# Patient Record
Sex: MALE | Race: WHITE | URBAN - METROPOLITAN AREA
[De-identification: names, ages, dates, MRNs, and addresses within clinical notes are randomized per-mention and may not be internally consistent; named-entity substitution may affect disease eponyms.]

---

## 2020-09-22 ENCOUNTER — OFFICE VISIT (OUTPATIENT)
Dept: URBAN - METROPOLITAN AREA CLINIC 128 | Facility: CLINIC | Age: 65
End: 2020-09-22
Payer: MEDICARE

## 2020-09-22 DIAGNOSIS — R19.5 POSITIVE COLORECTAL CANCER SCREENING USING COLOGUARD TEST: ICD-10-CM

## 2020-09-22 DIAGNOSIS — K21.9 GERD (GASTROESOPHAGEAL REFLUX DISEASE): ICD-10-CM

## 2020-09-22 DIAGNOSIS — K59.01 CONSTIPATION: ICD-10-CM

## 2020-09-22 PROCEDURE — G8420 CALC BMI NORM PARAMETERS: HCPCS | Performed by: INTERNAL MEDICINE

## 2020-09-22 PROCEDURE — G8427 DOCREV CUR MEDS BY ELIG CLIN: HCPCS | Performed by: INTERNAL MEDICINE

## 2020-09-22 PROCEDURE — 3017F COLORECTAL CA SCREEN DOC REV: CPT | Performed by: INTERNAL MEDICINE

## 2020-09-22 PROCEDURE — G9903 PT SCRN TBCO ID AS NON USER: HCPCS | Performed by: INTERNAL MEDICINE

## 2020-09-22 PROCEDURE — 99214 OFFICE O/P EST MOD 30 MIN: CPT | Performed by: INTERNAL MEDICINE

## 2020-09-22 RX ORDER — ATORVASTATIN CALCIUM 10 MG/1
1 TABLET TABLET, FILM COATED ORAL ONCE A DAY
Status: ACTIVE | COMMUNITY

## 2020-09-22 RX ORDER — BISOPROLOL FUMARATE 5 MG/1
1 TABLET TABLET, FILM COATED ORAL ONCE A DAY
Status: ACTIVE | COMMUNITY

## 2020-09-22 RX ORDER — ASPIRIN 81 MG/1
1 TABLET TABLET, COATED ORAL ONCE A DAY
Status: ACTIVE | COMMUNITY

## 2020-09-22 NOTE — HPI-TODAY'S VISIT:
Mr. Portillo is a pleasant 65-year-old gentleman who comes in today in consultation for positive Cologuard test.  Mr. Portillo offers GERD symptoms 3-4 times a week for most of his adult life which she has treated only with as needed medications.  No nausea or vomiting, no dysphagia or odynophagia.  Appetite is good and weight is stable. Mr. Portillo offers no prior colon cancer screening before recent Cologuard testing that was positive.  He offers no obvious hematochezia or melena.  Bowel movements are chronically irregular describing 3-4 bowel movements per week.  No known family history of colon polyps or colon cancer.  He takes a baby aspirin daily but denies use of other nonsteroidal anti-inflammatory drugs on a regular basis.  He denies excessive alcohol use.

## 2020-10-06 ENCOUNTER — OFFICE VISIT (OUTPATIENT)
Dept: URBAN - METROPOLITAN AREA SURGERY CENTER 31 | Facility: SURGERY CENTER | Age: 65
End: 2020-10-06
Payer: MEDICARE

## 2020-10-06 DIAGNOSIS — D12.7 BENIGN NEOPLASM OF RECTOSIGMOID JUNCTION: ICD-10-CM

## 2020-10-06 DIAGNOSIS — K31.89 ACQUIRED DEFORMITY OF DUODENUM: ICD-10-CM

## 2020-10-06 DIAGNOSIS — D12.4 ADENOMA OF DESCENDING COLON: ICD-10-CM

## 2020-10-06 DIAGNOSIS — D12.3 ADENOMA OF TRANSVERSE COLON: ICD-10-CM

## 2020-10-06 DIAGNOSIS — R19.5 ABNORMAL FECES: ICD-10-CM

## 2020-10-06 DIAGNOSIS — K22.8 COLUMNAR-LINED ESOPHAGUS: ICD-10-CM

## 2020-10-06 PROCEDURE — 45385 COLONOSCOPY W/LESION REMOVAL: CPT | Performed by: INTERNAL MEDICINE

## 2020-10-06 PROCEDURE — 43239 EGD BIOPSY SINGLE/MULTIPLE: CPT | Performed by: INTERNAL MEDICINE

## 2020-10-06 PROCEDURE — G9937 DIG OR SURV COLSCO: HCPCS | Performed by: INTERNAL MEDICINE

## 2020-10-06 PROCEDURE — G8907 PT DOC NO EVENTS ON DISCHARG: HCPCS | Performed by: INTERNAL MEDICINE

## 2020-11-04 ENCOUNTER — OFFICE VISIT (OUTPATIENT)
Dept: URBAN - METROPOLITAN AREA CLINIC 128 | Facility: CLINIC | Age: 65
End: 2020-11-04

## 2020-11-16 ENCOUNTER — OFFICE VISIT (OUTPATIENT)
Dept: URBAN - METROPOLITAN AREA CLINIC 128 | Facility: CLINIC | Age: 65
End: 2020-11-16
Payer: MEDICARE

## 2020-11-16 ENCOUNTER — WEB ENCOUNTER (OUTPATIENT)
Dept: URBAN - METROPOLITAN AREA CLINIC 128 | Facility: CLINIC | Age: 65
End: 2020-11-16

## 2020-11-16 DIAGNOSIS — K22.70 BARRETTS ESOPHAGUS: ICD-10-CM

## 2020-11-16 DIAGNOSIS — K59.01 CONSTIPATION: ICD-10-CM

## 2020-11-16 DIAGNOSIS — K29.60 OTHER GASTRITIS WITHOUT BLEEDING: ICD-10-CM

## 2020-11-16 DIAGNOSIS — R19.5 POSITIVE COLORECTAL CANCER SCREENING USING COLOGUARD TEST: ICD-10-CM

## 2020-11-16 PROBLEM — 302914006 BARRETTS ESOPHAGUS: Status: ACTIVE | Noted: 2020-11-16

## 2020-11-16 PROBLEM — 235595009 GASTROESOPHAGEAL REFLUX DISEASE: Status: ACTIVE | Noted: 2020-11-16

## 2020-11-16 PROBLEM — 14760008 CONSTIPATION: Status: ACTIVE | Noted: 2020-11-16

## 2020-11-16 PROBLEM — 4556007 GASTRITIS: Status: ACTIVE | Noted: 2020-11-16

## 2020-11-16 PROCEDURE — G8483 FLU IMM NO ADMIN DOC REA: HCPCS | Performed by: INTERNAL MEDICINE

## 2020-11-16 PROCEDURE — G8427 DOCREV CUR MEDS BY ELIG CLIN: HCPCS | Performed by: INTERNAL MEDICINE

## 2020-11-16 PROCEDURE — G9902 PT SCRN TBCO AND ID AS USER: HCPCS | Performed by: INTERNAL MEDICINE

## 2020-11-16 PROCEDURE — 4004F PT TOBACCO SCREEN RCVD TLK: CPT | Performed by: INTERNAL MEDICINE

## 2020-11-16 PROCEDURE — 99213 OFFICE O/P EST LOW 20 MIN: CPT | Performed by: INTERNAL MEDICINE

## 2020-11-16 PROCEDURE — G9906 PT RECV TBCO CESS INTERV: HCPCS | Performed by: INTERNAL MEDICINE

## 2020-11-16 PROCEDURE — G8420 CALC BMI NORM PARAMETERS: HCPCS | Performed by: INTERNAL MEDICINE

## 2020-11-16 PROCEDURE — 3017F COLORECTAL CA SCREEN DOC REV: CPT | Performed by: INTERNAL MEDICINE

## 2020-11-16 RX ORDER — BISOPROLOL FUMARATE 5 MG/1
1 TABLET TABLET, FILM COATED ORAL ONCE A DAY
Status: ACTIVE | COMMUNITY

## 2020-11-16 RX ORDER — PANTOPRAZOLE SODIUM 40 MG/1
1 TABLET TABLET, DELAYED RELEASE ORAL ONCE A DAY
Qty: 90 | Refills: 3 | OUTPATIENT
Start: 2020-11-16

## 2020-11-16 RX ORDER — ASPIRIN 81 MG/1
1 TABLET TABLET, COATED ORAL ONCE A DAY
Status: ACTIVE | COMMUNITY

## 2020-11-16 RX ORDER — ATORVASTATIN CALCIUM 10 MG/1
1 TABLET TABLET, FILM COATED ORAL ONCE A DAY
Status: ACTIVE | COMMUNITY

## 2020-11-16 NOTE — HPI-TODAY'S VISIT:
Mr. Portillo is a pleasant 65-year-old gentleman who comes in today in consultation for positive Cologuard test.  Mr. Portillo offers GERD symptoms 3-4 times a week for most of his adult life which she has treated only with as needed medications.  No nausea or vomiting, no dysphagia or odynophagia.  Appetite is good and weight is stable. Mr. Portillo offers no prior colon cancer screening before recent Cologuard testing that was positive.  He offers no obvious hematochezia or melena.  Bowel movements are chronically irregular describing 3-4 bowel movements per week.  No known family history of colon polyps or colon cancer.  He takes a baby aspirin daily but denies use of other nonsteroidal anti-inflammatory drugs on a regular basis.  He denies excessive alcohol use. His 11/2020 EGD revealed gastritis and positive for Barretts esophagus and colonoscopy revealed multiple polyps, tubular adenomas, serrated adenomas and hyperplastic polyp, the largest was 11mm.

## 2021-11-28 ENCOUNTER — ERX REFILL RESPONSE (OUTPATIENT)
Dept: URBAN - METROPOLITAN AREA CLINIC 128 | Facility: CLINIC | Age: 66
End: 2021-11-28

## 2021-11-28 RX ORDER — PANTOPRAZOLE 40 MG/1
TAKE (1) ONE TABLET BY MOUTH ONCE DAILY TABLET, DELAYED RELEASE ORAL
Qty: 90 TABLET | Refills: 2 | OUTPATIENT

## 2021-11-28 RX ORDER — PANTOPRAZOLE SODIUM 40 MG/1
1 TABLET TABLET, DELAYED RELEASE ORAL ONCE A DAY
Qty: 90 | Refills: 3 | OUTPATIENT

## 2022-04-24 ENCOUNTER — ERX REFILL RESPONSE (OUTPATIENT)
Dept: URBAN - METROPOLITAN AREA CLINIC 128 | Facility: CLINIC | Age: 67
End: 2022-04-24

## 2022-04-24 RX ORDER — PANTOPRAZOLE 40 MG/1
TAKE (1) ONE TABLET BY MOUTH ONCE DAILY TABLET, DELAYED RELEASE ORAL
Qty: 90 TABLET | Refills: 2 | OUTPATIENT

## 2022-04-24 RX ORDER — PANTOPRAZOLE 40 MG/1
TAKE (1) ONE TABLET BY MOUTH ONCE DAILY TABLET, DELAYED RELEASE ORAL
Qty: 90 TABLET | Refills: 1 | OUTPATIENT

## 2023-06-07 ENCOUNTER — OFFICE VISIT (OUTPATIENT)
Dept: URBAN - METROPOLITAN AREA CLINIC 128 | Facility: CLINIC | Age: 68
End: 2023-06-07
Payer: MEDICARE

## 2023-06-07 ENCOUNTER — DASHBOARD ENCOUNTERS (OUTPATIENT)
Age: 68
End: 2023-06-07

## 2023-06-07 VITALS
BODY MASS INDEX: 27.97 KG/M2 | HEIGHT: 67 IN | WEIGHT: 178.2 LBS | HEART RATE: 70 BPM | DIASTOLIC BLOOD PRESSURE: 82 MMHG | TEMPERATURE: 97.9 F | SYSTOLIC BLOOD PRESSURE: 126 MMHG

## 2023-06-07 DIAGNOSIS — K22.70 BARRETTS ESOPHAGUS: ICD-10-CM

## 2023-06-07 DIAGNOSIS — K59.01 CONSTIPATION: ICD-10-CM

## 2023-06-07 DIAGNOSIS — K63.5 COLON POLYPS: ICD-10-CM

## 2023-06-07 DIAGNOSIS — K29.70 GASTRITIS: ICD-10-CM

## 2023-06-07 PROCEDURE — 99214 OFFICE O/P EST MOD 30 MIN: CPT | Performed by: PHYSICIAN ASSISTANT

## 2023-06-07 RX ORDER — ASPIRIN 81 MG/1
1 TABLET TABLET, COATED ORAL ONCE A DAY
Status: ACTIVE | COMMUNITY

## 2023-06-07 RX ORDER — BISOPROLOL FUMARATE 5 MG/1
1 TABLET TABLET, FILM COATED ORAL ONCE A DAY
Status: ON HOLD | COMMUNITY

## 2023-06-07 RX ORDER — ATORVASTATIN CALCIUM 10 MG/1
1 TABLET TABLET, FILM COATED ORAL ONCE A DAY
Status: ON HOLD | COMMUNITY

## 2023-06-07 RX ORDER — PANTOPRAZOLE SODIUM 40 MG/1
1 TABLET TABLET, DELAYED RELEASE ORAL ONCE A DAY
Qty: 90 | Refills: 3 | OUTPATIENT

## 2023-06-07 RX ORDER — PANTOPRAZOLE 40 MG/1
TAKE (1) ONE TABLET BY MOUTH ONCE DAILY TABLET, DELAYED RELEASE ORAL
Qty: 90 TABLET | Refills: 1 | Status: ACTIVE | COMMUNITY

## 2023-06-07 RX ORDER — ROSUVASTATIN CALCIUM 5 MG/1
1 TABLET TABLET, COATED ORAL ONCE A DAY
Status: ACTIVE | COMMUNITY

## 2023-06-07 NOTE — HPI-TODAY'S VISIT:
The patient presents for a colonoscopy. There is no family history of colon polyps or colon cancer. Patient denies change in bowel habits, appetite, and weight. Patient denies rectal bleeding. Patient denies any cardiac, lung, or kidney problems. Patient denies any digestive symptoms currently. Last colonoscopy: 2020, hyperplastic polyp, tubular adenoma, and sessile serrated colon polyps were noted and a 1 year colonoscopy was advised so he is overdue. His 2020 EGD revealed gastritis and he was positive for Barretts and a 2 year repeat EGD was advised so he is overdue for this too.

## 2023-07-05 ENCOUNTER — OFFICE VISIT (OUTPATIENT)
Dept: URBAN - METROPOLITAN AREA SURGERY CENTER 31 | Facility: SURGERY CENTER | Age: 68
End: 2023-07-05
Payer: MEDICARE

## 2023-07-05 ENCOUNTER — CLAIMS CREATED FROM THE CLAIM WINDOW (OUTPATIENT)
Dept: URBAN - METROPOLITAN AREA CLINIC 4 | Facility: CLINIC | Age: 68
End: 2023-07-05
Payer: MEDICARE

## 2023-07-05 DIAGNOSIS — Z86.010 ADENOMAS PERSONAL HISTORY OF COLONIC POLYPS: ICD-10-CM

## 2023-07-05 DIAGNOSIS — K22.70 BARRETT ESOPHAGUS: ICD-10-CM

## 2023-07-05 DIAGNOSIS — K22.719 BARRETT'S ESOPHAGUS WITH DYSPLASIA, UNSPECIFIED: ICD-10-CM

## 2023-07-05 PROCEDURE — G0105 COLORECTAL SCRN; HI RISK IND: HCPCS | Performed by: INTERNAL MEDICINE

## 2023-07-05 PROCEDURE — 43239 EGD BIOPSY SINGLE/MULTIPLE: CPT | Performed by: INTERNAL MEDICINE

## 2023-07-05 PROCEDURE — 88313 SPECIAL STAINS GROUP 2: CPT | Performed by: PATHOLOGY

## 2023-07-05 PROCEDURE — 88305 TISSUE EXAM BY PATHOLOGIST: CPT | Performed by: PATHOLOGY

## 2023-07-05 PROCEDURE — 88312 SPECIAL STAINS GROUP 1: CPT | Performed by: PATHOLOGY

## 2024-02-05 ENCOUNTER — APPOINTMENT (RX ONLY)
Dept: URBAN - METROPOLITAN AREA CLINIC 159 | Facility: CLINIC | Age: 69
Setting detail: DERMATOLOGY
End: 2024-02-05

## 2024-02-05 PROBLEM — C44.42 SQUAMOUS CELL CARCINOMA OF SKIN OF SCALP AND NECK: Status: ACTIVE | Noted: 2024-02-05

## 2024-02-05 PROCEDURE — ? PRESCRIPTION

## 2024-02-05 PROCEDURE — 13121 CMPLX RPR S/A/L 2.6-7.5 CM: CPT

## 2024-02-05 PROCEDURE — ? MOHS SURGERY

## 2024-02-05 PROCEDURE — 17311 MOHS 1 STAGE H/N/HF/G: CPT

## 2024-02-05 RX ORDER — CEPHALEXIN 500 MG/1
CAPSULE ORAL
Qty: 14 | Refills: 0 | COMMUNITY
Start: 2024-02-05

## 2024-02-05 RX ORDER — TRAMADOL HYDROCHLORIDE 50 MG/1
TABLET, FILM COATED ORAL
Qty: 10 | Refills: 0 | COMMUNITY
Start: 2024-02-05

## 2024-02-05 RX ADMIN — CEPHALEXIN: 500 CAPSULE ORAL at 00:00

## 2024-02-05 RX ADMIN — TRAMADOL HYDROCHLORIDE: 50 TABLET, FILM COATED ORAL at 00:00

## 2024-02-05 NOTE — PROCEDURE: MOHS SURGERY
Mohs Case Number: 
Date Of Previous Biopsy (Optional): 1-15-24
Previous Accession (Optional): [id number]
Biopsy Photograph Reviewed: Yes
Referring Physician (Optional): Mukesh
Consent Type: Consent 1 (Standard)
Eye Shield Used: No
Initial Size Of Lesion: 1.2
Number Of Stages: 1
Primary Defect Length In Cm (Final Defect Size - Required For Flaps/Grafts): 1.6
Repair Type: Complex Repair
Which Eyelid Repair Cpt Are You Using?: 19177
Oculoplastic Surgeon Procedure Text (A): After obtaining clear surgical margins the patient was sent to oculoplastics for surgical repair.  The patient understands they will receive post-surgical care and follow-up from the referring physician's office.
Otolaryngologist Procedure Text (A): After obtaining clear surgical margins the patient was sent to otolaryngology for surgical repair.  The patient understands they will receive post-surgical care and follow-up from the referring physician's office.
Plastic Surgeon Procedure Text (A): After obtaining clear surgical margins the patient was sent to plastics for surgical repair.  The patient understands they will receive post-surgical care and follow-up from the referring physician's office.
Mid-Level Procedure Text (A): After obtaining clear surgical margins the patient was sent to a mid-level provider for surgical repair.  The patient understands they will receive post-surgical care and follow-up from the mid-level provider.
Provider Procedure Text (A): After obtaining clear surgical margins the defect was repaired by another provider.
Asc Procedure Text (A): After obtaining clear surgical margins the patient was sent to an ASC for surgical repair.  The patient understands they will receive post-surgical care and follow-up from the ASC physician.
Simple / Intermediate / Complex Repair - Final Wound Length In Cm: 4.5
Suturegard Retention Suture: 2-0 Nylon
Retention Suture Bite Size: 3 mm
Length To Time In Minutes Device Was In Place: 10
Distance Of Undermining In Cm (Required): 2.2
Undermining Type: Entire Wound
Debridement Text: The wound edges were debrided prior to proceeding with the closure to facilitate wound healing.
Helical Rim Text: The closure involved the helical rim.
Vermilion Border Text: The closure involved the vermilion border.
Nostril Rim Text: The closure involved the nostril rim.
Retention Suture Text: Retention sutures were placed to support the closure and prevent dehiscence.
Secondary Defect Length In Cm (Required For Flaps): 0
Area H Indication Text: Tumors in this location are included in Area H (eyelids, eyebrows, nose, lips, chin, ear, pre-auricular, post-auricular, temple, genitalia, hands, feet, ankles and areola).  Tissue conservation is critical in these anatomic locations.
Area M Indication Text: Tumors in this location are included in Area M (cheek, forehead, scalp, neck, jawline and pretibial skin).  Mohs surgery is indicated for tumors in these anatomic locations.
Area L Indication Text: Tumors in this location are included in Area L (trunk and extremities).  Mohs surgery is indicated for larger tumors, or tumors with aggressive histologic features, in these anatomic locations.
Tumor Debulked?: not debulked
Depth Of Tumor Invasion (For Histology): tumor not visualized
Perineural Invasion (For Histology - Be Specific If Possible): absent
Stage 1: Number Of Blocks?: 2
Special Stains Stage 1 - Results: Base On Clearance Noted Above
Stage 2: Additional Anesthesia Type: 1% lidocaine with epinephrine
Staging Info: By selecting yes to the question above you will include information on AJCC 8 tumor staging in your Mohs note. Information on tumor staging will be automatically added for SCCs on the head and neck. AJCC 8 includes tumor size, tumor depth, perineural involvement and bone invasion.
Tumor Depth: Less than 6mm from granular layer and no invasion beyond the subcutaneous fat
Why Was The Change Made?: Please Select the Appropriate Response
Medical Necessity Statement: Based on my medical judgement, Mohs surgery is the most appropriate treatment for this cancer compared to other treatments.
Alternatives Discussed Intro (Do Not Add Period): I discussed alternative treatments to Mohs surgery and specifically discussed the risks and benefits of
Consent 1/Introductory Paragraph: The rationale for Mohs was explained to the patient and consent was obtained. The risks, benefits and alternatives to therapy were discussed in detail. Specifically, the risks of infection, scarring, bleeding, prolonged wound healing, incomplete removal, allergy to anesthesia, nerve injury and recurrence were addressed. Prior to the procedure, the treatment site was clearly identified and confirmed by the patient. All components of Universal Protocol/PAUSE Rule completed.
Consent 2/Introductory Paragraph: Mohs surgery was explained to the patient and consent was obtained. The risks, benefits and alternatives to therapy were discussed in detail. Specifically, the risks of infection, scarring, bleeding, prolonged wound healing, incomplete removal, allergy to anesthesia, nerve injury and recurrence were addressed. Prior to the procedure, the treatment site was clearly identified and confirmed by the patient. All components of Universal Protocol/PAUSE Rule completed.
Consent 3/Introductory Paragraph: I gave the patient a chance to ask questions they had about the procedure.  Following this I explained the Mohs procedure and consent was obtained. The risks, benefits and alternatives to therapy were discussed in detail. Specifically, the risks of infection, scarring, bleeding, prolonged wound healing, incomplete removal, allergy to anesthesia, nerve injury and recurrence were addressed. Prior to the procedure, the treatment site was clearly identified and confirmed by the patient. All components of Universal Protocol/PAUSE Rule completed.
Consent (Temporal Branch)/Introductory Paragraph: The rationale for Mohs was explained to the patient and consent was obtained. The risks, benefits and alternatives to therapy were discussed in detail. Specifically, the risks of damage to the temporal branch of the facial nerve, infection, scarring, bleeding, prolonged wound healing, incomplete removal, allergy to anesthesia, and recurrence were addressed. Prior to the procedure, the treatment site was clearly identified and confirmed by the patient. All components of Universal Protocol/PAUSE Rule completed.
Consent (Marginal Mandibular)/Introductory Paragraph: The rationale for Mohs was explained to the patient and consent was obtained. The risks, benefits and alternatives to therapy were discussed in detail. Specifically, the risks of damage to the marginal mandibular branch of the facial nerve, infection, scarring, bleeding, prolonged wound healing, incomplete removal, allergy to anesthesia, and recurrence were addressed. Prior to the procedure, the treatment site was clearly identified and confirmed by the patient. All components of Universal Protocol/PAUSE Rule completed.
Consent (Spinal Accessory)/Introductory Paragraph: The rationale for Mohs was explained to the patient and consent was obtained. The risks, benefits and alternatives to therapy were discussed in detail. Specifically, the risks of damage to the spinal accessory nerve, infection, scarring, bleeding, prolonged wound healing, incomplete removal, allergy to anesthesia, and recurrence were addressed. Prior to the procedure, the treatment site was clearly identified and confirmed by the patient. All components of Universal Protocol/PAUSE Rule completed.
Consent (Near Eyelid Margin)/Introductory Paragraph: The rationale for Mohs was explained to the patient and consent was obtained. The risks, benefits and alternatives to therapy were discussed in detail. Specifically, the risks of ectropion or eyelid deformity, infection, scarring, bleeding, prolonged wound healing, incomplete removal, allergy to anesthesia, nerve injury and recurrence were addressed. Prior to the procedure, the treatment site was clearly identified and confirmed by the patient. All components of Universal Protocol/PAUSE Rule completed.
Consent (Ear)/Introductory Paragraph: The rationale for Mohs was explained to the patient and consent was obtained. The risks, benefits and alternatives to therapy were discussed in detail. Specifically, the risks of ear deformity, infection, scarring, bleeding, prolonged wound healing, incomplete removal, allergy to anesthesia, nerve injury and recurrence were addressed. Prior to the procedure, the treatment site was clearly identified and confirmed by the patient. All components of Universal Protocol/PAUSE Rule completed.
Consent (Nose)/Introductory Paragraph: The rationale for Mohs was explained to the patient and consent was obtained. The risks, benefits and alternatives to therapy were discussed in detail. Specifically, the risks of nasal deformity, changes in the flow of air through the nose, infection, scarring, bleeding, prolonged wound healing, incomplete removal, allergy to anesthesia, nerve injury and recurrence were addressed. Prior to the procedure, the treatment site was clearly identified and confirmed by the patient. All components of Universal Protocol/PAUSE Rule completed.
Consent (Lip)/Introductory Paragraph: The rationale for Mohs was explained to the patient and consent was obtained. The risks, benefits and alternatives to therapy were discussed in detail. Specifically, the risks of lip deformity, changes in the oral aperture, infection, scarring, bleeding, prolonged wound healing, incomplete removal, allergy to anesthesia, nerve injury and recurrence were addressed. Prior to the procedure, the treatment site was clearly identified and confirmed by the patient. All components of Universal Protocol/PAUSE Rule completed.
Consent (Scalp)/Introductory Paragraph: The rationale for Mohs was explained to the patient and consent was obtained. The risks, benefits and alternatives to therapy were discussed in detail. Specifically, the risks of changes in hair growth pattern secondary to repair, infection, scarring, bleeding, prolonged wound healing, incomplete removal, allergy to anesthesia, nerve injury and recurrence were addressed. Prior to the procedure, the treatment site was clearly identified and confirmed by the patient. All components of Universal Protocol/PAUSE Rule completed.
Detail Level: Detailed
Postop Diagnosis: same
Anesthesia Type: 1% lidocaine with epinephrine and a 1:10 solution of 8.4% sodium bicarbonate
Anesthesia Volume In Cc: 3
Hemostasis: Electrocautery
Estimated Blood Loss (Cc): minimal
Repair Anesthesia Method: local infiltration
Anesthesia Volume In Cc: 6
Brow Lift Text: A midfrontal incision was made medially to the defect to allow access to the tissues just superior to the left eyebrow. Following careful dissection inferiorly in a supraperiosteal plane to the level of the left eyebrow, several 3-0 monocryl sutures were used to resuspend the eyebrow orbicularis oculi muscular unit to the superior frontal bone periosteum. This resulted in an appropriate reapproximation of static eyebrow symmetry and correction of the left brow ptosis.
Deep Sutures: 3-0 Vicryl
Epidermal Sutures: 4-0 Prolene
Epidermal Closure: simple interrupted
Suturegard Intro: Intraoperative tissue expansion was performed, utilizing the SUTUREGARD device, in order to reduce wound tension.
Suturegard Body: The suture ends were repeatedly re-tightened and re-clamped to achieve the desired tissue expansion.
Hemigard Intro: Due to skin fragility and wound tension, it was decided to use HEMIGARD adhesive retention suture devices to permit a linear closure. The skin was cleaned and dried for a 6cm distance away from the wound. Excessive hair, if present, was removed to allow for adhesion.
Hemigard Postcare Instructions: The HEMIGARD strips are to remain completely dry for at least 5-7 days.
Donor Site Anesthesia Type: same as repair anesthesia
Graft Donor Site Bandage (Optional-Leave Blank If You Don't Want In Note): Steri-strips and a pressure bandage were applied to the donor site.
Closure 2 Information: This tab is for additional flaps and grafts, including complex repair and grafts and complex repair and flaps. You can also specify a different location for the additional defect, if the location is the same you do not need to select a new one. We will insert the automated text for the repair you select below just as we do for solitary flaps and grafts. Please note that at this time if you select a location with a different insurance zone you will need to override the ICD10 and CPT if appropriate.
Closure 3 Information: This tab is for additional flaps and grafts above and beyond our usual structured repairs.  Please note if you enter information here it will not currently bill and you will need to add the billing information manually.
Wound Care: Vaseline
Dressing: pressure dressing with telfa
Wound Care (No Sutures): Petrolatum
Dressing (No Sutures): dry sterile dressing
Suture Removal: 16 days
Unna Boot Text: An Unna boot was placed to help immobilize the limb and facilitate more rapid healing.
Home Suture Removal Text: Patient was provided instructions on removing sutures and will remove their sutures at home.  If they have any questions or difficulties they will call the office.
Post-Care Instructions: I reviewed with the patient in detail post-care instructions. Patient is not to engage in any heavy lifting, exercise, or swimming for the next 14 days. Should the patient develop any fevers, chills, bleeding, severe pain patient will contact the office immediately.
Pain Refusal Text: I offered to prescribe pain medication but the patient refused to take this medication.
Mauc Instructions: By selecting yes to the question below the MAUC number will be added into the note.  This will be calculated automatically based on the diagnosis chosen, the size entered, the body zone selected (H,M,L) and the specific indications you chose. You will also have the option to override the Mohs AUC if you disagree with the automatically calculated number and this option is found in the Case Summary tab.
Where Do You Want The Question To Include Opioid Counseling Located?: Case Summary Tab
Eye Protection Verbiage: Before proceeding with the stage, a plastic scleral shield was inserted. The globe was anesthetized with a few drops of 1% lidocaine with 1:100,000 epinephrine. Then, an appropriate sized scleral shield was chosen and coated with lacrilube ointment. The shield was gently inserted and left in place for the duration of each stage. After the stage was completed, the shield was gently removed.
Mohs Method Verbiage: An incision at a 45 degree angle following the standard Mohs approach was done and the specimen was harvested as a microscopic controlled layer.
Surgeon/Pathologist Verbiage (Will Incorporate Name Of Surgeon From Intro If Not Blank): operated in two distinct and integrated capacities as the surgeon and pathologist.
Mohs Histo Method Verbiage: Each section was then chromacoded and processed in the Mohs lab using the Mohs protocol and submitted for frozen section, utilizing hematoxylin and eosin histochemical stains.
Subsequent Stages Histo Method Verbiage: Using a similar technique to that described above, a thin layer of tissue was removed from all areas where tumor was visible on the previous stage.  The tissue was again oriented, mapped, dyed, and processed as above.
Mohs Rapid Report Verbiage: The area of clinically evident tumor was marked with skin marking ink and appropriately hatched.  The initial incision was made following the Mohs approach through the skin.  The specimen was taken to the lab, divided into the necessary number of pieces, chromacoded and processed according to the Mohs protocol.  This was repeated in successive stages until a tumor free defect was achieved.
Complex Repair Preamble Text (Leave Blank If You Do Not Want): Extensive wide undermining was performed.
Intermediate Repair Preamble Text (Leave Blank If You Do Not Want): Undermining was performed with blunt dissection.
Non-Graft Cartilage Fenestration Text: The cartilage was fenestrated with a 2mm punch biopsy to help facilitate healing.
Graft Cartilage Fenestration Text: The cartilage was fenestrated with a 2mm punch biopsy to help facilitate graft survival and healing.
Secondary Intention Text (Leave Blank If You Do Not Want): The defect will heal with secondary intention.
No Repair - Repaired With Adjacent Surgical Defect Text (Leave Blank If You Do Not Want): After obtaining clear surgical margins the defect was repaired concurrently with another surgical defect which was in close approximation.
Adjacent Tissue Transfer Text: The defect edges were debeveled with a #15 scalpel blade. Given the location of the defect and the proximity to free margins an adjacent tissue transfer was deemed most appropriate. Using a sterile surgical marker, an appropriate flap was drawn incorporating the defect and placing the expected incisions within the relaxed skin tension lines where possible. The area thus outlined was incised deep to adipose tissue with a #15 scalpel blade. The skin margins were undermined to an appropriate distance in all directions utilizing iris scissors and carried over to close the primary defect.
Advancement Flap (Single) Text: The defect edges were debeveled with a #15 scalpel blade. Given the location of the defect and the proximity to free margins a single advancement flap was deemed most appropriate. Using a sterile surgical marker, an appropriate advancement flap was drawn incorporating the defect and placing the expected incisions within the relaxed skin tension lines where possible. The area thus outlined was incised deep to adipose tissue with a #15 scalpel blade. The skin margins were undermined to an appropriate distance in all directions utilizing iris scissors. Following this, the designed flap was advanced and carried over into the primary defect and sutured into place.
Advancement Flap (Double) Text: The defect edges were debeveled with a #15 scalpel blade. Given the location of the defect and the proximity to free margins a double advancement flap was deemed most appropriate. Using a sterile surgical marker, the appropriate advancement flaps were drawn incorporating the defect and placing the expected incisions within the relaxed skin tension lines where possible. The area thus outlined was incised deep to adipose tissue with a #15 scalpel blade. The skin margins were undermined to an appropriate distance in all directions utilizing iris scissors. Following this, the designed flaps were advanced and carried over into the primary defect and sutured into place.
Burow's Advancement Flap Text: The defect edges were debeveled with a #15 scalpel blade. Given the location of the defect and the proximity to free margins a Burow's advancement flap was deemed most appropriate. Using a sterile surgical marker, the appropriate advancement flap was drawn incorporating the defect and placing the expected incisions within the relaxed skin tension lines where possible. The area thus outlined was incised deep to adipose tissue with a #15 scalpel blade. The skin margins were undermined to an appropriate distance in all directions utilizing iris scissors. Following this, the designed flap was advanced and carried over into the primary defect and sutured into place.
Chonodrocutaneous Helical Advancement Flap Text: The defect edges were debeveled with a #15 scalpel blade. Given the location of the defect and the proximity to free margins a chondrocutaneous helical advancement flap was deemed most appropriate. Using a sterile surgical marker, the appropriate advancement flap was drawn incorporating the defect and placing the expected incisions within the relaxed skin tension lines where possible. The area thus outlined was incised deep to adipose tissue with a #15 scalpel blade. The skin margins were undermined to an appropriate distance in all directions utilizing iris scissors. Following this, the designed flap was advanced and carried over into the primary defect and sutured into place.
Crescentic Advancement Flap Text: The defect edges were debeveled with a #15 scalpel blade. Given the location of the defect and the proximity to free margins a crescentic advancement flap was deemed most appropriate. Using a sterile surgical marker, the appropriate advancement flap was drawn incorporating the defect and placing the expected incisions within the relaxed skin tension lines where possible. The area thus outlined was incised deep to adipose tissue with a #15 scalpel blade. The skin margins were undermined to an appropriate distance in all directions utilizing iris scissors. Following this, the designed flap was advanced and carried over into the primary defect and sutured into place.
A-T Advancement Flap Text: The defect edges were debeveled with a #15 scalpel blade. Given the location of the defect, shape of the defect and the proximity to free margins an A-T advancement flap was deemed most appropriate. Using a sterile surgical marker, an appropriate advancement flap was drawn incorporating the defect and placing the expected incisions within the relaxed skin tension lines where possible. The area thus outlined was incised deep to adipose tissue with a #15 scalpel blade. The skin margins were undermined to an appropriate distance in all directions utilizing iris scissors. Following this, the designed flap was advanced and carried over into the primary defect and sutured into place.
O-T Advancement Flap Text: The defect edges were debeveled with a #15 scalpel blade. Given the location of the defect, shape of the defect and the proximity to free margins an O-T advancement flap was deemed most appropriate. Using a sterile surgical marker, an appropriate advancement flap was drawn incorporating the defect and placing the expected incisions within the relaxed skin tension lines where possible. The area thus outlined was incised deep to adipose tissue with a #15 scalpel blade. The skin margins were undermined to an appropriate distance in all directions utilizing iris scissors. Following this, the designed flap was advanced and carried over into the primary defect and sutured into place.
O-L Flap Text: The defect edges were debeveled with a #15 scalpel blade. Given the location of the defect, shape of the defect and the proximity to free margins an O-L flap was deemed most appropriate. Using a sterile surgical marker, an appropriate advancement flap was drawn incorporating the defect and placing the expected incisions within the relaxed skin tension lines where possible. The area thus outlined was incised deep to adipose tissue with a #15 scalpel blade. The skin margins were undermined to an appropriate distance in all directions utilizing iris scissors. Following this, the designed flap was advanced and carried over into the primary defect and sutured into place.
O-Z Flap Text: The defect edges were debeveled with a #15 scalpel blade. Given the location of the defect, shape of the defect and the proximity to free margins an O-Z flap was deemed most appropriate. Using a sterile surgical marker, an appropriate transposition flap was drawn incorporating the defect and placing the expected incisions within the relaxed skin tension lines where possible. The area thus outlined was incised deep to adipose tissue with a #15 scalpel blade. The skin margins were undermined to an appropriate distance in all directions utilizing iris scissors. Following this, the designed flap was carried over into the primary defect and sutured into place.
Double O-Z Flap Text: The defect edges were debeveled with a #15 scalpel blade. Given the location of the defect, shape of the defect and the proximity to free margins a Double O-Z flap was deemed most appropriate. Using a sterile surgical marker, an appropriate transposition flap was drawn incorporating the defect and placing the expected incisions within the relaxed skin tension lines where possible. The area thus outlined was incised deep to adipose tissue with a #15 scalpel blade. The skin margins were undermined to an appropriate distance in all directions utilizing iris scissors. Following this, the designed flap was carried over into the primary defect and sutured into place.
V-Y Flap Text: The defect edges were debeveled with a #15 scalpel blade. Given the location of the defect, shape of the defect and the proximity to free margins a V-Y flap was deemed most appropriate. Using a sterile surgical marker, an appropriate advancement flap was drawn incorporating the defect and placing the expected incisions within the relaxed skin tension lines where possible. The area thus outlined was incised deep to adipose tissue with a #15 scalpel blade. The skin margins were undermined to an appropriate distance in all directions utilizing iris scissors. Following this, the designed flap was advanced and carried over into the primary defect and sutured into place.
Advancement-Rotation Flap Text: The defect edges were debeveled with a #15 scalpel blade. Given the location of the defect, shape of the defect and the proximity to free margins an advancement-rotation flap was deemed most appropriate. Using a sterile surgical marker, an appropriate flap was drawn incorporating the defect and placing the expected incisions within the relaxed skin tension lines where possible. The area thus outlined was incised deep to adipose tissue with a #15 scalpel blade. The skin margins were undermined to an appropriate distance in all directions utilizing iris scissors. Following this, the designed flap was carried over into the primary defect and sutured into place.
Mercedes Flap Text: The defect edges were debeveled with a #15 scalpel blade. Given the location of the defect, shape of the defect and the proximity to free margins a Mercedes flap was deemed most appropriate. Using a sterile surgical marker, an appropriate advancement flap was drawn incorporating the defect and placing the expected incisions within the relaxed skin tension lines where possible. The area thus outlined was incised deep to adipose tissue with a #15 scalpel blade. The skin margins were undermined to an appropriate distance in all directions utilizing iris scissors. Following this, the designed flap was advanced and carried over into the primary defect and sutured into place.
Modified Advancement Flap Text: The defect edges were debeveled with a #15 scalpel blade. Given the location of the defect, shape of the defect and the proximity to free margins a modified advancement flap was deemed most appropriate. Using a sterile surgical marker, an appropriate advancement flap was drawn incorporating the defect and placing the expected incisions within the relaxed skin tension lines where possible. The area thus outlined was incised deep to adipose tissue with a #15 scalpel blade. The skin margins were undermined to an appropriate distance in all directions utilizing iris scissors. Following this, the designed flap was advanced and carried over into the primary defect and sutured into place.
Mucosal Advancement Flap Text: Given the location of the defect, shape of the defect and the proximity to free margins a mucosal advancement flap was deemed most appropriate. Incisions were made with a 15 blade scalpel in the appropriate fashion along the cutaneous vermilion border and the mucosal lip. The remaining actinically damaged mucosal tissue was excised.  The mucosal advancement flap was then elevated to the gingival sulcus with care taken to preserve the neurovascular structures and advanced into the primary defect. Care was taken to ensure that precise realignment of the vermilion border was achieved.
Peng Advancement Flap Text: The defect edges were debeveled with a #15 scalpel blade. Given the location of the defect, shape of the defect and the proximity to free margins a Peng advancement flap was deemed most appropriate. Using a sterile surgical marker, an appropriate advancement flap was drawn incorporating the defect and placing the expected incisions within the relaxed skin tension lines where possible. The area thus outlined was incised deep to adipose tissue with a #15 scalpel blade. The skin margins were undermined to an appropriate distance in all directions utilizing iris scissors. Following this, the designed flap was advanced and carried over into the primary defect and sutured into place.
Hatchet Flap Text: The defect edges were debeveled with a #15 scalpel blade. Given the location of the defect, shape of the defect and the proximity to free margins a hatchet flap was deemed most appropriate. Using a sterile surgical marker, an appropriate hatchet flap was drawn incorporating the defect and placing the expected incisions within the relaxed skin tension lines where possible. The area thus outlined was incised deep to adipose tissue with a #15 scalpel blade. The skin margins were undermined to an appropriate distance in all directions utilizing iris scissors. Following this, the designed flap was carried over into the primary defect and sutured into place.
Rotation Flap Text: The defect edges were debeveled with a #15 scalpel blade. Given the location of the defect, shape of the defect and the proximity to free margins a rotation flap was deemed most appropriate. Using a sterile surgical marker, an appropriate rotation flap was drawn incorporating the defect and placing the expected incisions within the relaxed skin tension lines where possible. The area thus outlined was incised deep to adipose tissue with a #15 scalpel blade. The skin margins were undermined to an appropriate distance in all directions utilizing iris scissors. Following this, the designed flap was carried over into the primary defect and sutured into place.
Bilateral Rotation Flap Text: The defect edges were debeveled with a #15 scalpel blade. Given the location of the defect, shape of the defect and the proximity to free margins a bilateral rotation flap was deemed most appropriate. Using a sterile surgical marker, an appropriate rotation flap was drawn incorporating the defect and placing the expected incisions within the relaxed skin tension lines where possible. The area thus outlined was incised deep to adipose tissue with a #15 scalpel blade. The skin margins were undermined to an appropriate distance in all directions utilizing iris scissors. Following this, the designed flap was carried over into the primary defect and sutured into place.
Spiral Flap Text: The defect edges were debeveled with a #15 scalpel blade. Given the location of the defect, shape of the defect and the proximity to free margins a spiral flap was deemed most appropriate. Using a sterile surgical marker, an appropriate rotation flap was drawn incorporating the defect and placing the expected incisions within the relaxed skin tension lines where possible. The area thus outlined was incised deep to adipose tissue with a #15 scalpel blade. The skin margins were undermined to an appropriate distance in all directions utilizing iris scissors. Following this, the designed flap was carried over into the primary defect and sutured into place.
Staged Advancement Flap Text: The defect edges were debeveled with a #15 scalpel blade. Given the location of the defect, shape of the defect and the proximity to free margins a staged advancement flap was deemed most appropriate. Using a sterile surgical marker, an appropriate advancement flap was drawn incorporating the defect and placing the expected incisions within the relaxed skin tension lines where possible. The area thus outlined was incised deep to adipose tissue with a #15 scalpel blade. The skin margins were undermined to an appropriate distance in all directions utilizing iris scissors. Following this, the designed flap was carried over into the primary defect and sutured into place.
Star Wedge Flap Text: The defect edges were debeveled with a #15 scalpel blade. Given the location of the defect, shape of the defect and the proximity to free margins a star wedge flap was deemed most appropriate. Using a sterile surgical marker, an appropriate rotation flap was drawn incorporating the defect and placing the expected incisions within the relaxed skin tension lines where possible. The area thus outlined was incised deep to adipose tissue with a #15 scalpel blade. The skin margins were undermined to an appropriate distance in all directions utilizing iris scissors. Following this, the designed flap was carried over into the primary defect and sutured into place.
Transposition Flap Text: The defect edges were debeveled with a #15 scalpel blade. Given the location of the defect and the proximity to free margins a transposition flap was deemed most appropriate. Using a sterile surgical marker, an appropriate transposition flap was drawn incorporating the defect. The area thus outlined was incised deep to adipose tissue with a #15 scalpel blade. The skin margins were undermined to an appropriate distance in all directions utilizing iris scissors. Following this, the designed flap was carried over into the primary defect and sutured into place.
Muscle Hinge Flap Text: The defect edges were debeveled with a #15 scalpel blade.  Given the size, depth and location of the defect and the proximity to free margins a muscle hinge flap was deemed most appropriate. Using a sterile surgical marker, an appropriate hinge flap was drawn incorporating the defect. The area thus outlined was incised with a #15 scalpel blade. The skin margins were undermined to an appropriate distance in all directions utilizing iris scissors. Following this, the designed flap was carried into the primary defect and sutured into place.
Mustarde Flap Text: The defect edges were debeveled with a #15 scalpel blade.  Given the size, depth and location of the defect and the proximity to free margins a Mustarde flap was deemed most appropriate. Using a sterile surgical marker, an appropriate flap was drawn incorporating the defect. The area thus outlined was incised with a #15 scalpel blade. The skin margins were undermined to an appropriate distance in all directions utilizing iris scissors. Following this, the designed flap was carried into the primary defect and sutured into place.
Nasal Turnover Hinge Flap Text: The defect edges were debeveled with a #15 scalpel blade.  Given the size, depth, location of the defect and the defect being full thickness a nasal turnover hinge flap was deemed most appropriate. Using a sterile surgical marker, an appropriate hinge flap was drawn incorporating the defect. The area thus outlined was incised with a #15 scalpel blade. The flap was designed to recreate the nasal mucosal lining and the alar rim. The skin margins were undermined to an appropriate distance in all directions utilizing iris scissors. Following this, the designed flap was carried over into the primary defect and sutured into place
Nasalis-Muscle-Based Myocutaneous Island Pedicle Flap Text: Using a #15 blade, an incision was made around the donor flap to the level of the nasalis muscle. Wide lateral undermining was then performed in both the subcutaneous plane above the nasalis muscle, and in a submuscular plane just above periosteum. This allowed the formation of a free nasalis muscle axial pedicle (based on the angular artery) which was still attached to the actual cutaneous flap, increasing its mobility and vascular viability. Hemostasis was obtained with pinpoint electrocoagulation. The flap was mobilized into position and the pivotal anchor points positioned and stabilized with buried interrupted sutures. Subcutaneous and dermal tissues were closed in a multilayered fashion with sutures. Tissue redundancies were excised, and the epidermal edges were apposed without significant tension and sutured with sutures.
Orbicularis Oris Muscle Flap Text: The defect edges were debeveled with a #15 scalpel blade.  Given that the defect affected the competency of the oral sphincter an orbicularis oris muscle flap was deemed most appropriate to restore this competency and normal muscle function.  Using a sterile surgical marker, an appropriate flap was drawn incorporating the defect. The area thus outlined was incised with a #15 scalpel blade. Following this, the designed flap was carried over into the primary defect and sutured into place.
Melolabial Transposition Flap Text: The defect edges were debeveled with a #15 scalpel blade. Given the location of the defect and the proximity to free margins a melolabial flap was deemed most appropriate. Using a sterile surgical marker, an appropriate melolabial transposition flap was drawn incorporating the defect. The area thus outlined was incised deep to adipose tissue with a #15 scalpel blade. The skin margins were undermined to an appropriate distance in all directions utilizing iris scissors. Following this, the designed flap was carried over into the primary defect and sutured into place.
Rhombic Flap Text: The defect edges were debeveled with a #15 scalpel blade. Given the location of the defect and the proximity to free margins a rhombic flap was deemed most appropriate. Using a sterile surgical marker, an appropriate rhombic flap was drawn incorporating the defect. The area thus outlined was incised deep to adipose tissue with a #15 scalpel blade. The skin margins were undermined to an appropriate distance in all directions utilizing iris scissors. Following this, the designed flap was carried over into the primary defect and sutured into place.
Rhomboid Transposition Flap Text: The defect edges were debeveled with a #15 scalpel blade. Given the location of the defect and the proximity to free margins a rhomboid transposition flap was deemed most appropriate. Using a sterile surgical marker, an appropriate rhomboid flap was drawn incorporating the defect. The area thus outlined was incised deep to adipose tissue with a #15 scalpel blade. The skin margins were undermined to an appropriate distance in all directions utilizing iris scissors. Following this, the designed flap was carried over into the primary defect and sutured into place.
Bi-Rhombic Flap Text: The defect edges were debeveled with a #15 scalpel blade. Given the location of the defect and the proximity to free margins a bi-rhombic flap was deemed most appropriate. Using a sterile surgical marker, an appropriate rhombic flap was drawn incorporating the defect. The area thus outlined was incised deep to adipose tissue with a #15 scalpel blade. The skin margins were undermined to an appropriate distance in all directions utilizing iris scissors. Following this, the designed flap was carried over into the primary defect and sutured into place.
Helical Rim Advancement Flap Text: The defect edges were debeveled with a #15 blade scalpel.  Given the location of the defect and the proximity to free margins (helical rim) a double helical rim advancement flap was deemed most appropriate. Using a sterile surgical marker, the appropriate advancement flaps were drawn incorporating the defect and placing the expected incisions between the helical rim and antihelix where possible.  The area thus outlined was incised through and through with a #15 scalpel blade.  With a skin hook and iris scissors, the flaps were gently and sharply undermined and freed up. Folllowing this, the designed flaps were carried over into the primary defect and sutured into place.
Bilateral Helical Rim Advancement Flap Text: The defect edges were debeveled with a #15 blade scalpel.  Given the location of the defect and the proximity to free margins (helical rim) a bilateral helical rim advancement flap was deemed most appropriate. Using a sterile surgical marker, the appropriate advancement flaps were drawn incorporating the defect and placing the expected incisions between the helical rim and antihelix where possible.  The area thus outlined was incised through and through with a #15 scalpel blade.  With a skin hook and iris scissors, the flaps were gently and sharply undermined and freed up. Following this, the designed flaps were placed into the primary defect and sutured into place.
Ear Star Wedge Flap Text: The defect edges were debeveled with a #15 blade scalpel.  Given the location of the defect and the proximity to free margins (helical rim) an ear star wedge flap was deemed most appropriate. Using a sterile surgical marker, the appropriate flap was drawn incorporating the defect and placing the expected incisions between the helical rim and antihelix where possible.  The area thus outlined was incised through and through with a #15 scalpel blade. Following this, the designed flap was carried over into the primary defect and sutured into place.
Banner Transposition Flap Text: The defect edges were debeveled with a #15 scalpel blade. Given the location of the defect and the proximity to free margins a Banner transposition flap was deemed most appropriate. Using a sterile surgical marker, an appropriate flap was drawn around the defect. The area thus outlined was incised deep to adipose tissue with a #15 scalpel blade. The skin margins were undermined to an appropriate distance in all directions utilizing iris scissors. Following this, the designed flap was carried into the primary defect and sutured into place.
Bilobed Flap Text: The defect edges were debeveled with a #15 scalpel blade. Given the location of the defect and the proximity to free margins a bilobe flap was deemed most appropriate. Using a sterile surgical marker, an appropriate bilobe flap drawn around the defect. The area thus outlined was incised deep to adipose tissue with a #15 scalpel blade. The skin margins were undermined to an appropriate distance in all directions utilizing iris scissors. Following this, the designed flap was carried over into the primary defect and sutured into place.
Bilobed Transposition Flap Text: The defect edges were debeveled with a #15 scalpel blade. Given the location of the defect and the proximity to free margins a bilobed transposition flap was deemed most appropriate. Using a sterile surgical marker, an appropriate bilobe flap drawn around the defect. The area thus outlined was incised deep to adipose tissue with a #15 scalpel blade. The skin margins were undermined to an appropriate distance in all directions utilizing iris scissors. Following this, the designed flap was carried over into the primary defect and sutured into place.
Trilobed Flap Text: The defect edges were debeveled with a #15 scalpel blade. Given the location of the defect and the proximity to free margins a trilobed flap was deemed most appropriate. Using a sterile surgical marker, an appropriate trilobed flap was drawn around the defect. The area thus outlined was incised deep to adipose tissue with a #15 scalpel blade. The skin margins were undermined to an appropriate distance in all directions utilizing iris scissors. Following this, the designed flap was carried into the primary defect and sutured into place.
Dorsal Nasal Flap Text: The defect edges were debeveled with a #15 scalpel blade. Given the location of the defect and the proximity to free margins a dorsal nasal flap was deemed most appropriate. Using a sterile surgical marker, an appropriate dorsal nasal flap was drawn around the defect. The area thus outlined was incised deep to adipose tissue with a #15 scalpel blade. The skin margins were undermined to an appropriate distance in all directions utilizing iris scissors. Following this, the designed flap was carried into the primary defect and sutured into place.
Island Pedicle Flap Text: The defect edges were debeveled with a #15 scalpel blade. Given the location of the defect, shape of the defect and the proximity to free margins an island pedicle advancement flap was deemed most appropriate. Using a sterile surgical marker, an appropriate advancement flap was drawn incorporating the defect, outlining the appropriate donor tissue and placing the expected incisions within the relaxed skin tension lines where possible. The area thus outlined was incised deep to adipose tissue with a #15 scalpel blade. The skin margins were undermined to an appropriate distance in all directions around the primary defect and laterally outward around the island pedicle utilizing iris scissors.  There was minimal undermining beneath the pedicle flap. Following this, the flap was carried over into the primary defect and sutured into place.
Island Pedicle Flap With Canthal Suspension Text: The defect edges were debeveled with a #15 scalpel blade. Given the location of the defect, shape of the defect and the proximity to free margins an island pedicle advancement flap was deemed most appropriate. Using a sterile surgical marker, an appropriate advancement flap was drawn incorporating the defect, outlining the appropriate donor tissue and placing the expected incisions within the relaxed skin tension lines where possible. The area thus outlined was incised deep to adipose tissue with a #15 scalpel blade. The skin margins were undermined to an appropriate distance in all directions around the primary defect and laterally outward around the island pedicle utilizing iris scissors.  There was minimal undermining beneath the pedicle flap. A suspension suture was placed in the canthal tendon to prevent tension and prevent ectropion. Following this, the designed flap was placed into the primary defect and sutured into place.
Alar Island Pedicle Flap Text: The defect edges were debeveled with a #15 scalpel blade. Given the location of the defect, shape of the defect and the proximity to the alar rim an island pedicle advancement flap was deemed most appropriate. Using a sterile surgical marker, an appropriate advancement flap was drawn incorporating the defect, outlining the appropriate donor tissue and placing the expected incisions within the nasal ala running parallel to the alar rim. The area thus outlined was incised with a #15 scalpel blade. The skin margins were undermined minimally to an appropriate distance in all directions around the primary defect and laterally outward around the island pedicle utilizing iris scissors.  There was minimal undermining beneath the pedicle flap. Following this, the designed flap was carried over into the primary defect and sutured into place.
Double Island Pedicle Flap Text: The defect edges were debeveled with a #15 scalpel blade. Given the location of the defect, shape of the defect and the proximity to free margins a double island pedicle advancement flap was deemed most appropriate. Using a sterile surgical marker, an appropriate advancement flap was drawn incorporating the defect, outlining the appropriate donor tissue and placing the expected incisions within the relaxed skin tension lines where possible. The area thus outlined was incised deep to adipose tissue with a #15 scalpel blade. The skin margins were undermined to an appropriate distance in all directions around the primary defect and laterally outward around the island pedicle utilizing iris scissors.  There was minimal undermining beneath the pedicle flap. Following this, the flap was carried over into the primary defect and sutured into place.
Island Pedicle Flap-Requiring Vessel Identification Text: The defect edges were debeveled with a #15 scalpel blade. Given the location of the defect, shape of the defect and the proximity to free margins an island pedicle advancement flap was deemed most appropriate. Using a sterile surgical marker, an appropriate advancement flap was drawn, based on the axial vessel mentioned above, incorporating the defect, outlining the appropriate donor tissue and placing the expected incisions within the relaxed skin tension lines where possible. The area thus outlined was incised deep to adipose tissue with a #15 scalpel blade. The skin margins were undermined to an appropriate distance in all directions around the primary defect and laterally outward around the island pedicle utilizing iris scissors.  There was minimal undermining beneath the pedicle flap. Following this, the designed flap was carried over into the primary defect and sutured into place.
Keystone Flap Text: The defect edges were debeveled with a #15 scalpel blade. Given the location of the defect, shape of the defect a keystone flap was deemed most appropriate. Using a sterile surgical marker, an appropriate keystone flap was drawn incorporating the defect, outlining the appropriate donor tissue and placing the expected incisions within the relaxed skin tension lines where possible. The area thus outlined was incised deep to adipose tissue with a #15 scalpel blade. The skin margins were undermined to an appropriate distance in all directions around the primary defect and laterally outward around the flap utilizing iris scissors. Following this, the designed flap was carried into the primary defect and sutured into place.
O-T Plasty Text: The defect edges were debeveled with a #15 scalpel blade. Given the location of the defect, shape of the defect and the proximity to free margins an O-T plasty was deemed most appropriate. Using a sterile surgical marker, an appropriate O-T plasty was drawn incorporating the defect and placing the expected incisions within the relaxed skin tension lines where possible. The area thus outlined was incised deep to adipose tissue with a #15 scalpel blade. The skin margins were undermined to an appropriate distance in all directions utilizing iris scissors. Following this, the designed flap was carried over into the primary defect and sutured into place.
O-Z Plasty Text: The defect edges were debeveled with a #15 scalpel blade. Given the location of the defect, shape of the defect and the proximity to free margins an O-Z plasty (double transposition flap) was deemed most appropriate. Using a sterile surgical marker, the appropriate transposition flaps were drawn incorporating the defect and placing the expected incisions within the relaxed skin tension lines where possible. The area thus outlined was incised deep to adipose tissue with a #15 scalpel blade. The skin margins were undermined to an appropriate distance in all directions utilizing iris scissors. Hemostasis was achieved with electrocautery. The flaps were then transposed and carried over into place, one clockwise and the other counterclockwise, and anchored with interrupted buried subcutaneous sutures.
Double O-Z Plasty Text: The defect edges were debeveled with a #15 scalpel blade. Given the location of the defect, shape of the defect and the proximity to free margins a Double O-Z plasty (double transposition flap) was deemed most appropriate. Using a sterile surgical marker, the appropriate transposition flaps were drawn incorporating the defect and placing the expected incisions within the relaxed skin tension lines where possible. The area thus outlined was incised deep to adipose tissue with a #15 scalpel blade. The skin margins were undermined to an appropriate distance in all directions utilizing iris scissors. Hemostasis was achieved with electrocautery. The flaps were then transposed and carried over into place, one clockwise and the other counterclockwise, and anchored with interrupted buried subcutaneous sutures.
V-Y Plasty Text: The defect edges were debeveled with a #15 scalpel blade. Given the location of the defect, shape of the defect and the proximity to free margins an V-Y advancement flap was deemed most appropriate. Using a sterile surgical marker, an appropriate advancement flap was drawn incorporating the defect and placing the expected incisions within the relaxed skin tension lines where possible. The area thus outlined was incised deep to adipose tissue with a #15 scalpel blade. The skin margins were undermined to an appropriate distance in all directions utilizing iris scissors. Following this, the designed flap was advanced and carried over into the primary defect and sutured into place.
H Plasty Text: Given the location of the defect, shape of the defect and the proximity to free margins a H-plasty was deemed most appropriate for repair. Using a sterile surgical marker, the appropriate advancement arms of the H-plasty were drawn incorporating the defect and placing the expected incisions within the relaxed skin tension lines where possible. The area thus outlined was incised deep to adipose tissue with a #15 scalpel blade. The skin margins were undermined to an appropriate distance in all directions utilizing iris scissors.  The opposing advancement arms were then advanced and carried over into place in opposite direction and anchored with interrupted buried subcutaneous sutures.
W Plasty Text: The lesion was extirpated to the level of the fat with a #15 scalpel blade. Given the location of the defect, shape of the defect and the proximity to free margins a W-plasty was deemed most appropriate for repair. Using a sterile surgical marker, the appropriate transposition arms of the W-plasty were drawn incorporating the defect and placing the expected incisions within the relaxed skin tension lines where possible. The area thus outlined was incised deep to adipose tissue with a #15 scalpel blade. The skin margins were undermined to an appropriate distance in all directions utilizing iris scissors. The opposing transposition arms were then transposed and carried over into place in opposite direction and anchored with interrupted buried subcutaneous sutures.
Z Plasty Text: The lesion was extirpated to the level of the fat with a #15 scalpel blade. Given the location of the defect, shape of the defect and the proximity to free margins a Z-plasty was deemed most appropriate for repair. Using a sterile surgical marker, the appropriate transposition arms of the Z-plasty were drawn incorporating the defect and placing the expected incisions within the relaxed skin tension lines where possible. The area thus outlined was incised deep to adipose tissue with a #15 scalpel blade. The skin margins were undermined to an appropriate distance in all directions utilizing iris scissors. The opposing transposition arms were then transposed and carried over into place in opposite direction and anchored with interrupted buried subcutaneous sutures.
Double Z Plasty Text: The lesion was extirpated to the level of the fat with a #15 scalpel blade. Given the location of the defect, shape of the defect and the proximity to free margins a double Z-plasty was deemed most appropriate for repair. Using a sterile surgical marker, the appropriate transposition arms of the double Z-plasty were drawn incorporating the defect and placing the expected incisions within the relaxed skin tension lines where possible. The area thus outlined was incised deep to adipose tissue with a #15 scalpel blade. The skin margins were undermined to an appropriate distance in all directions utilizing iris scissors. The opposing transposition arms were then transposed and carried over into place in opposite direction and anchored with interrupted buried subcutaneous sutures.
Zygomaticofacial Flap Text: Given the location of the defect, shape of the defect and the proximity to free margins a zygomaticofacial flap was deemed most appropriate for repair. Using a sterile surgical marker, the appropriate flap was drawn incorporating the defect and placing the expected incisions within the relaxed skin tension lines where possible. The area thus outlined was incised deep to adipose tissue with a #15 scalpel blade with preservation of a vascular pedicle.  The skin margins were undermined to an appropriate distance in all directions utilizing iris scissors. The flap was then carried over into the defect and anchored with interrupted buried subcutaneous sutures.
Cheek Interpolation Flap Text: A decision was made to reconstruct the defect utilizing an interpolation axial flap and a staged reconstruction.  A telfa template was made of the defect.  This telfa template was then used to outline the Cheek Interpolation flap.  The donor area for the pedicle flap was then injected with anesthesia.  The flap was excised through the skin and subcutaneous tissue down to the layer of the underlying musculature.  The interpolation flap was carefully excised within this deep plane to maintain its blood supply.  The edges of the donor site were undermined.   The donor site was closed in a primary fashion.  The pedicle was then rotated into position and sutured.  Once the tube was sutured into place, adequate blood supply was confirmed with blanching and refill.  The pedicle was then wrapped with xeroform gauze and dressed appropriately with a telfa and gauze bandage to ensure continued blood supply and protect the attached pedicle.
Cheek-To-Nose Interpolation Flap Text: A decision was made to reconstruct the defect utilizing an interpolation axial flap and a staged reconstruction.  A telfa template was made of the defect.  This telfa template was then used to outline the Cheek-To-Nose Interpolation flap.  The donor area for the pedicle flap was then injected with anesthesia.  The flap was excised through the skin and subcutaneous tissue down to the layer of the underlying musculature.  The interpolation flap was carefully excised within this deep plane to maintain its blood supply.  The edges of the donor site were undermined.   The donor site was closed in a primary fashion.  The pedicle was then rotated into position and sutured.  Once the tube was sutured into place, adequate blood supply was confirmed with blanching and refill.  The pedicle was then wrapped with xeroform gauze and dressed appropriately with a telfa and gauze bandage to ensure continued blood supply and protect the attached pedicle.
Interpolation Flap Text: A decision was made to reconstruct the defect utilizing an interpolation axial flap and a staged reconstruction.  A telfa template was made of the defect.  This telfa template was then used to outline the interpolation flap.  The donor area for the pedicle flap was then injected with anesthesia.  The flap was excised through the skin and subcutaneous tissue down to the layer of the underlying musculature.  The interpolation flap was carefully excised within this deep plane to maintain its blood supply.  The edges of the donor site were undermined.   The donor site was closed in a primary fashion.  The pedicle was then rotated into position and sutured.  Once the tube was sutured into place, adequate blood supply was confirmed with blanching and refill.  The pedicle was then wrapped with xeroform gauze and dressed appropriately with a telfa and gauze bandage to ensure continued blood supply and protect the attached pedicle.
Melolabial Interpolation Flap Text: A decision was made to reconstruct the defect utilizing an interpolation axial flap and a staged reconstruction.  A telfa template was made of the defect.  This telfa template was then used to outline the melolabial interpolation flap.  The donor area for the pedicle flap was then injected with anesthesia.  The flap was excised through the skin and subcutaneous tissue down to the layer of the underlying musculature.  The pedicle flap was carefully excised within this deep plane to maintain its blood supply.  The edges of the donor site were undermined.   The donor site was closed in a primary fashion.  The pedicle was then rotated into position and sutured.  Once the tube was sutured into place, adequate blood supply was confirmed with blanching and refill.  The pedicle was then wrapped with xeroform gauze and dressed appropriately with a telfa and gauze bandage to ensure continued blood supply and protect the attached pedicle.
Mastoid Interpolation Flap Text: A decision was made to reconstruct the defect utilizing an interpolation axial flap and a staged reconstruction.  A telfa template was made of the defect.  This telfa template was then used to outline the mastoid interpolation flap.  The donor area for the pedicle flap was then injected with anesthesia.  The flap was excised through the skin and subcutaneous tissue down to the layer of the underlying musculature.  The pedicle flap was carefully excised within this deep plane to maintain its blood supply.  The edges of the donor site were undermined.   The donor site was closed in a primary fashion.  The pedicle was then rotated into position and sutured.  Once the tube was sutured into place, adequate blood supply was confirmed with blanching and refill.  The pedicle was then wrapped with xeroform gauze and dressed appropriately with a telfa and gauze bandage to ensure continued blood supply and protect the attached pedicle.
Posterior Auricular Interpolation Flap Text: A decision was made to reconstruct the defect utilizing an interpolation axial flap and a staged reconstruction.  A telfa template was made of the defect.  This telfa template was then used to outline the posterior auricular interpolation flap.  The donor area for the pedicle flap was then injected with anesthesia.  The flap was excised through the skin and subcutaneous tissue down to the layer of the underlying musculature.  The pedicle flap was carefully excised within this deep plane to maintain its blood supply.  The edges of the donor site were undermined.   The donor site was closed in a primary fashion.  The pedicle was then rotated into position and sutured.  Once the tube was sutured into place, adequate blood supply was confirmed with blanching and refill.  The pedicle was then wrapped with xeroform gauze and dressed appropriately with a telfa and gauze bandage to ensure continued blood supply and protect the attached pedicle.
Paramedian Forehead Flap Text: A decision was made to reconstruct the defect utilizing an interpolation axial flap and a staged reconstruction.  A telfa template was made of the defect.  This telfa template was then used to outline the paramedian forehead pedicle flap.  The donor area for the pedicle flap was then injected with anesthesia.  The flap was excised through the skin and subcutaneous tissue down to the layer of the underlying musculature.  The pedicle flap was carefully excised within this deep plane to maintain its blood supply.  The edges of the donor site were undermined.   The donor site was closed in a primary fashion.  The pedicle was then rotated into position and sutured.  Once the tube was sutured into place, adequate blood supply was confirmed with blanching and refill.  The pedicle was then wrapped with xeroform gauze and dressed appropriately with a telfa and gauze bandage to ensure continued blood supply and protect the attached pedicle.
Abbe Flap (Upper To Lower Lip) Text: The defect of the lower lip was assessed and measured.  Given the location and size of the defect, an Abbe flap was deemed most appropriate. Using a sterile surgical marker, an appropriate Abbe flap was measured and drawn on the upper lip. Local anesthesia was then infiltrated.  A scalpel was then used to incise the upper lip through and through the skin, vermilion, muscle and mucosa, leaving the flap pedicled on the opposite side.  The flap was then rotated and transferred to the lower lip defect.  The flap was then sutured into place with a three layer technique, closing the orbicularis oris muscle layer with subcutaneous buried sutures, followed by a mucosal layer and an epidermal layer.
Abbe Flap (Lower To Upper Lip) Text: The defect of the upper lip was assessed and measured.  Given the location and size of the defect, an Abbe flap was deemed most appropriate. Using a sterile surgical marker, an appropriate Abbe flap was measured and drawn on the lower lip. Local anesthesia was then infiltrated. A scalpel was then used to incise the upper lip through and through the skin, vermilion, muscle and mucosa, leaving the flap pedicled on the opposite side.  The flap was then rotated and transferred to the lower lip defect.  The flap was then sutured into place with a three layer technique, closing the orbicularis oris muscle layer with subcutaneous buried sutures, followed by a mucosal layer and an epidermal layer.
Estlander Flap (Upper To Lower Lip) Text: The defect of the lower lip was assessed and measured.  Given the location and size of the defect, an Estlander flap was deemed most appropriate. Using a sterile surgical marker, an appropriate Estlander flap was measured and drawn on the upper lip. Local anesthesia was then infiltrated. A scalpel was then used to incise the lateral aspect of the flap, through skin, muscle and mucosa, leaving the flap pedicled medially.  The flap was then rotated and positioned to fill the lower lip defect.  The flap was then sutured into place with a three layer technique, closing the orbicularis oris muscle layer with subcutaneous buried sutures, followed by a mucosal layer and an epidermal layer.
Cheiloplasty (Less Than 50%) Text: A decision was made to reconstruct the defect with a  cheiloplasty.  The defect was undermined extensively.  Additional orbicularis oris muscle was excised with a 15 blade scalpel.  The defect was converted into a full thickness wedge, of less than 50% of the vertical height of the lip, to facilite a better cosmetic result.  Small vessels were then tied off with 5-0 monocyrl. The orbicularis oris, superficial fascia, adipose and dermis were then reapproximated.  After the deeper layers were approximated the epidermis was reapproximated with particular care given to realign the vermilion border.
Cheiloplasty (Complex) Text: A decision was made to reconstruct the defect with a  cheiloplasty.  The defect was undermined extensively.  Additional orbicularis oris muscle was excised with a 15 blade scalpel.  The defect was converted into a full thickness wedge to facilite a better cosmetic result.  Small vessels were then tied off with 5-0 monocyrl. The orbicularis oris, superficial fascia, adipose and dermis were then reapproximated.  After the deeper layers were approximated the epidermis was reapproximated with particular care given to realign the vermilion border.
Ear Wedge Repair Text: A wedge excision was completed by carrying down an excision through the full thickness of the ear and cartilage with an inward facing Burow's triangle. The wound was then closed in a layered fashion.
Full Thickness Lip Wedge Repair (Flap) Text: Given the location of the defect and the proximity to free margins a full thickness wedge repair was deemed most appropriate. Using a sterile surgical marker, the appropriate repair was drawn incorporating the defect and placing the expected incisions perpendicular to the vermilion border.  The vermilion border was also meticulously outlined to ensure appropriate reapproximation during the repair.  The area thus outlined was incised through and through with a #15 scalpel blade.  The muscularis and dermis were reaproximated with deep sutures following hemostasis. Care was taken to realign the vermilion border before proceeding with the superficial closure.  Once the vermilion was realigned the superfical and mucosal closure was finished.
Ftsg Text: The defect edges were debeveled with a #15 scalpel blade. Given the location of the defect, shape of the defect and the proximity to free margins a full thickness skin graft was deemed most appropriate. Using a sterile surgical marker, the primary defect shape was transferred to the donor site. The area thus outlined was incised deep to adipose tissue with a #15 scalpel blade.  The harvested graft was then trimmed of adipose tissue until only dermis and epidermis was left.  The skin margins of the secondary defect were undermined to an appropriate distance in all directions utilizing iris scissors.  The secondary defect was closed with interrupted buried subcutaneous sutures.  The skin edges were then re-apposed with running  sutures.  The skin graft was then placed in the primary defect and oriented appropriately.
Split-Thickness Skin Graft Text: The defect edges were debeveled with a #15 scalpel blade. Given the location of the defect, shape of the defect and the proximity to free margins a split thickness skin graft was deemed most appropriate. Using a sterile surgical marker, the primary defect shape was transferred to the donor site. The split thickness graft was then harvested.  The skin graft was then placed in the primary defect and oriented appropriately.
Pinch Graft Text: The defect edges were debeveled with a #15 scalpel blade. Given the location of the defect, shape of the defect and the proximity to free margins a pinch graft was deemed most appropriate. Using a sterile surgical marker, the primary defect shape was transferred to the donor site. The area thus outlined was incised deep to adipose tissue with a #15 scalpel blade.  The harvested graft was then trimmed of adipose tissue until only dermis and epidermis was left. The skin margins of the secondary defect were undermined to an appropriate distance in all directions utilizing iris scissors.  The secondary defect was closed with interrupted buried subcutaneous sutures.  The skin edges were then re-apposed with running  sutures.  The skin graft was then placed in the primary defect and oriented appropriately.
Burow's Graft Text: The defect edges were debeveled with a #15 scalpel blade. Given the location of the defect, shape of the defect, the proximity to free margins and the presence of a standing cone deformity a Burow's skin graft was deemed most appropriate. The standing cone was removed and this tissue was then trimmed to the shape of the primary defect. The adipose tissue was also removed until only dermis and epidermis were left.  The skin margins of the secondary defect were undermined to an appropriate distance in all directions utilizing iris scissors.  The secondary defect was closed with interrupted buried subcutaneous sutures.  The skin edges were then re-apposed with running  sutures.  The skin graft was then placed in the primary defect and oriented appropriately.
Cartilage Graft Text: The defect edges were debeveled with a #15 scalpel blade. Given the location of the defect, shape of the defect, the fact the defect involved a full thickness cartilage defect a cartilage graft was deemed most appropriate.  An appropriate donor site was identified, cleansed, and anesthetized. The cartilage graft was then harvested and transferred to the recipient site, oriented appropriately and then sutured into place.  The secondary defect was then repaired using a primary closure.
Composite Graft Text: The defect edges were debeveled with a #15 scalpel blade. Given the location of the defect, shape of the defect, the proximity to free margins and the fact the defect was full thickness a composite graft was deemed most appropriate.  The defect was outline and then transferred to the donor site.  A full thickness graft was then excised from the donor site. The graft was then placed in the primary defect, oriented appropriately and then sutured into place.  The secondary defect was then repaired using a primary closure.
Epidermal Autograft Text: The defect edges were debeveled with a #15 scalpel blade. Given the location of the defect, shape of the defect and the proximity to free margins an epidermal autograft was deemed most appropriate. Using a sterile surgical marker, the primary defect shape was transferred to the donor site. The epidermal graft was then harvested.  The skin graft was then placed in the primary defect and oriented appropriately.
Dermal Autograft Text: The defect edges were debeveled with a #15 scalpel blade. Given the location of the defect, shape of the defect and the proximity to free margins a dermal autograft was deemed most appropriate. Using a sterile surgical marker, the primary defect shape was transferred to the donor site. The area thus outlined was incised deep to adipose tissue with a #15 scalpel blade.  The harvested graft was then trimmed of adipose and epidermal tissue until only dermis was left.  The skin graft was then placed in the primary defect and oriented appropriately.
Skin Substitute Text: The defect edges were debeveled with a #15 scalpel blade. Given the location of the defect, shape of the defect and the proximity to free margins a skin substitute graft was deemed most appropriate.  The graft material was trimmed to fit the size of the defect. The graft was then placed in the primary defect and oriented appropriately.
Tissue Cultured Epidermal Autograft Text: The defect edges were debeveled with a #15 scalpel blade. Given the location of the defect, shape of the defect and the proximity to free margins a tissue cultured epidermal autograft was deemed most appropriate.  The graft was then trimmed to fit the size of the defect.  The graft was then placed in the primary defect and oriented appropriately.
Xenograft Text: The defect edges were debeveled with a #15 scalpel blade. Given the location of the defect, shape of the defect and the proximity to free margins a xenograft was deemed most appropriate.  The graft was then trimmed to fit the size of the defect.  The graft was then placed in the primary defect and oriented appropriately.
Purse String (Simple) Text: Given the location of the defect and the characteristics of the surrounding skin a purse string closure was deemed most appropriate.  Undermining was performed circumferentially around the surgical defect.  A purse string suture was then placed and tightened.
Purse String (Intermediate) Text: Given the location of the defect and the characteristics of the surrounding skin a purse string intermediate closure was deemed most appropriate.  Undermining was performed circumferentially around the surgical defect.  A purse string suture was then placed and tightened.
Partial Purse String (Simple) Text: Given the location of the defect and the characteristics of the surrounding skin a simple purse string closure was deemed most appropriate.  Undermining was performed circumferentially around the surgical defect.  A purse string suture was then placed and tightened. Wound tension only allowed a partial closure of the circular defect.
Partial Purse String (Intermediate) Text: Given the location of the defect and the characteristics of the surrounding skin an intermediate purse string closure was deemed most appropriate.  Undermining was performed circumferentially around the surgical defect.  A purse string suture was then placed and tightened. Wound tension only allowed a partial closure of the circular defect.
Localized Dermabrasion With Wire Brush Text: The patient was draped in routine manner.  Localized dermabrasion using 3 x 17 mm wire brush was performed in routine manner to papillary dermis. This spot dermabrasion is being performed to complete skin cancer reconstruction. It also will eliminate the other sun damaged precancerous cells that are known to be part of the regional effect of a lifetime's worth of sun exposure. This localized dermabrasion is therapeutic and should not be considered cosmetic in any regard.
Tarsorrhaphy Text: A tarsorrhaphy was performed using Frost sutures.
Intermediate Repair And Flap Additional Text (Will Appearing After The Standard Complex Repair Text): The intermediate repair was not sufficient to completely close the primary defect. The remaining additional defect was repaired with the flap mentioned below.
Intermediate Repair And Graft Additional Text (Will Appearing After The Standard Complex Repair Text): The intermediate repair was not sufficient to completely close the primary defect. The remaining additional defect was repaired with the graft mentioned below.
Complex Repair And Flap Additional Text (Will Appearing After The Standard Complex Repair Text): The complex repair was not sufficient to completely close the primary defect. The remaining additional defect was repaired with the flap mentioned below.
Complex Repair And Graft Additional Text (Will Appearing After The Standard Complex Repair Text): The complex repair was not sufficient to completely close the primary defect. The remaining additional defect was repaired with the graft mentioned below.
Eyelid Full Thickness Repair - 75800: The eyelid defect was full thickness which required a wedge repair of the eyelid. Special care was taken to ensure that the eyelid margin was realligned when placing sutures.
Eyelid Partial Thickness Repair - 33378: The eyelid defect was partial thickness which required a wedge repair of the eyelid. Special care was taken to ensure that the eyelid margin was realligned when placing sutures.
Manual Repair Warning Statement: We plan on removing the manually selected variable below in favor of our much easier automatic structured text blocks found in the previous tab. We decided to do this to help make the flow better and give you the full power of structured data. Manual selection is never going to be ideal in our platform and I would encourage you to avoid using manual selection from this point on, especially since I will be sunsetting this feature. It is important that you do one of two things with the customized text below. First, you can save all of the text in a word file so you can have it for future reference. Second, transfer the text to the appropriate area in the Library tab. Lastly, if there is a flap or graft type which we do not have you need to let us know right away so I can add it in before the variable is hidden. No need to panic, we plan to give you roughly 6 months to make the change.
Same Histology In Subsequent Stages Text: The pattern and morphology of the tumor is as described in the first stage.
No Residual Tumor Seen Histology Text: There were no malignant cells seen in the sections examined.
Inflammation Suggestive Of Cancer Camouflage Histology Text: There was a dense lymphocytic infiltrate which prevented adequate histologic evaluation of adjacent structures.
Bcc Histology Text: There were numerous aggregates of basaloid cells.
Bcc Infiltrative Histology Text: There were numerous aggregates of basaloid cells demonstrating an infiltrative pattern.
Mart-1 - Positive Histology Text: MART-1 staining demonstrates areas of higher density and clustering of melanocytes with Pagetoid spread upwards within the epidermis. The surgical margins are positive for tumor cells.
Mart-1 - Negative Histology Text: MART-1 staining demonstrates a normal density and pattern of melanocytes along the dermal-epidermal junction. The surgical margins are negative for tumor cells.
Information: Selecting Yes will display possible errors in your note based on the variables you have selected. This validation is only offered as a suggestion for you. PLEASE NOTE THAT THE VALIDATION TEXT WILL BE REMOVED WHEN YOU FINALIZE YOUR NOTE. IF YOU WANT TO FAX A PRELIMINARY NOTE YOU WILL NEED TO TOGGLE THIS TO 'NO' IF YOU DO NOT WANT IT IN YOUR FAXED NOTE.

## 2024-02-06 ENCOUNTER — RX ONLY (OUTPATIENT)
Age: 69
Setting detail: RX ONLY
End: 2024-02-06

## 2024-02-06 RX ORDER — HYDROCODONE BITARTRATE AND ACETAMINOPHEN 5; 325 MG/1; MG/1
TABLET ORAL
Qty: 8 | Refills: 0 | Status: CANCELLED
Stop reason: SDUPTHER

## 2024-02-06 RX ORDER — HYDROCODONE BITARTRATE AND ACETAMINOPHEN 5; 325 MG/1; MG/1
TABLET ORAL
Qty: 8 | Refills: 0 | COMMUNITY
Start: 2024-02-06

## 2024-02-23 ENCOUNTER — APPOINTMENT (RX ONLY)
Dept: URBAN - METROPOLITAN AREA CLINIC 159 | Facility: CLINIC | Age: 69
Setting detail: DERMATOLOGY
End: 2024-02-23

## 2024-02-23 DIAGNOSIS — Z48.817 ENCOUNTER FOR SURGICAL AFTERCARE FOLLOWING SURGERY ON THE SKIN AND SUBCUTANEOUS TISSUE: ICD-10-CM

## 2024-02-23 PROCEDURE — ? POST-OP WOUND EVALUATION

## 2024-02-23 PROCEDURE — 99024 POSTOP FOLLOW-UP VISIT: CPT

## 2024-02-23 ASSESSMENT — LOCATION ZONE DERM: LOCATION ZONE: SCALP

## 2024-02-23 ASSESSMENT — LOCATION SIMPLE DESCRIPTION DERM: LOCATION SIMPLE: RIGHT SCALP

## 2024-02-23 ASSESSMENT — LOCATION DETAILED DESCRIPTION DERM: LOCATION DETAILED: RIGHT CENTRAL FRONTAL SCALP

## 2024-02-23 NOTE — PROCEDURE: POST-OP WOUND EVALUATION
Body Location Override (Optional): right central parietal scalp
Detail Level: Simple
Add 64258 Cpt? (Important Note: In 2017 The Use Of 39943 Is Being Tracked By Cms To Determine Future Global Period Reimbursement For Global Periods): yes
Wound Diameter In Cm(Optional): 0

## 2024-04-02 ENCOUNTER — APPOINTMENT (RX ONLY)
Dept: URBAN - METROPOLITAN AREA CLINIC 159 | Facility: CLINIC | Age: 69
Setting detail: DERMATOLOGY
End: 2024-04-02

## 2024-04-02 DIAGNOSIS — L57.0 ACTINIC KERATOSIS: ICD-10-CM

## 2024-04-02 PROCEDURE — 96567 PDT DSTR PRMLG LES SKN: CPT

## 2024-04-02 PROCEDURE — ? PDT: BLUE

## 2024-04-02 ASSESSMENT — LOCATION ZONE DERM: LOCATION ZONE: SCALP

## 2024-04-02 ASSESSMENT — LOCATION SIMPLE DESCRIPTION DERM: LOCATION SIMPLE: FRONTAL SCALP

## 2024-04-02 ASSESSMENT — LOCATION DETAILED DESCRIPTION DERM: LOCATION DETAILED: MEDIAL FRONTAL SCALP

## 2024-04-02 NOTE — PROCEDURE: PDT: BLUE
Which Photosensitizer Was Used: Levulan
Show Medical Necessity In Plan?: Yes
Medical Necessity: Precancerous Lesions
Debridement Text (Will Only Render In Visit Note If You Select Debridement Option Under Who Performed The Pdt Field): Prior to application of the photodynamic medication the hyperkeratotic lesions were curetted to make them more amenable to therapy.
Number Of Kerasticks/Tubes Billed For: 1
Incubation Start Time: 1115am
Show Inventory Tab: Show
Anesthesia Type: 1% lidocaine with epinephrine
Illumination Time: 00:16:40
Occlusion: No
Incubation End Time: 115pm
Pre-Procedure Text: The treatment areas were cleaned and prepped in the usual fashion.
Post-Care Instructions: I reviewed with the patient in detail post-care instructions. Patient is to avoid sunlight for the next 2 days, and wear sun protection. Patients may expect sunburn like redness, discomfort and scabbing.
Detail Level: Zone
Light Source: Huber-U
Total Number Of Aks Treated (Optional To Report): 0
Who Performed The Pdt?: Performed by Nurse, MA or Aesthetician ([id number])
Consent: Written consent obtained.  The risks were reviewed with the patient including but not limited to: pigmentary changes, pain, blistering, scabbing, redness, and the remote possibility of scarring.
Who Performed The Pdt (Staff): kami
Comments: [address]
Incubation Time: 02:00:00

## 2024-07-10 ENCOUNTER — LAB OUTSIDE AN ENCOUNTER (OUTPATIENT)
Dept: URBAN - METROPOLITAN AREA CLINIC 128 | Facility: CLINIC | Age: 69
End: 2024-07-10

## 2024-07-10 ENCOUNTER — OFFICE VISIT (OUTPATIENT)
Dept: URBAN - METROPOLITAN AREA CLINIC 128 | Facility: CLINIC | Age: 69
End: 2024-07-10
Payer: MEDICARE

## 2024-07-10 VITALS
DIASTOLIC BLOOD PRESSURE: 74 MMHG | HEIGHT: 67 IN | SYSTOLIC BLOOD PRESSURE: 116 MMHG | BODY MASS INDEX: 27.78 KG/M2 | HEART RATE: 66 BPM | WEIGHT: 177 LBS | TEMPERATURE: 98.1 F

## 2024-07-10 DIAGNOSIS — R13.19 CERVICAL DYSPHAGIA: ICD-10-CM

## 2024-07-10 DIAGNOSIS — K22.70 BARRETTS ESOPHAGUS: ICD-10-CM

## 2024-07-10 DIAGNOSIS — K29.60 ADENOPAPILLOMATOSIS GASTRICA: ICD-10-CM

## 2024-07-10 DIAGNOSIS — K59.01 CONSTIPATION: ICD-10-CM

## 2024-07-10 PROBLEM — 40739000: Status: ACTIVE | Noted: 2024-07-10

## 2024-07-10 PROCEDURE — 99214 OFFICE O/P EST MOD 30 MIN: CPT | Performed by: PHYSICIAN ASSISTANT

## 2024-07-10 RX ORDER — ROSUVASTATIN CALCIUM 5 MG/1
1 TABLET TABLET, COATED ORAL ONCE A DAY
Status: ACTIVE | COMMUNITY

## 2024-07-10 RX ORDER — PANTOPRAZOLE SODIUM 40 MG/1
1 TABLET TABLET, DELAYED RELEASE ORAL ONCE A DAY
Qty: 90 | Refills: 3 | OUTPATIENT

## 2024-07-10 RX ORDER — ASPIRIN 81 MG/1
1 TABLET TABLET, COATED ORAL ONCE A DAY
Status: ACTIVE | COMMUNITY

## 2024-07-10 RX ORDER — PANTOPRAZOLE 40 MG/1
TAKE (1) ONE TABLET BY MOUTH ONCE DAILY TABLET, DELAYED RELEASE ORAL
Qty: 90 TABLET | Refills: 1 | Status: ACTIVE | COMMUNITY

## 2024-07-10 RX ORDER — FAMOTIDINE 20 MG/1
1 TABLET AT BEDTIME TABLET, FILM COATED ORAL ONCE A DAY
Qty: 30 TABLET | Refills: 5 | OUTPATIENT
Start: 2024-07-10

## 2024-07-10 NOTE — HPI-TODAY'S VISIT:
The patient presents for an evaluation of dysphagia. He feels like he chokes on his solid food and liquids sometimes. There is no family history of colon polyps or colon cancer. Patient denies change in bowel habits, appetite, and weight. Patient denies rectal bleeding. Patient denies any cardiac, lung, or kidney problems. Prior colonoscopy: 2020, hyperplastic polyp, tubular adenoma, and sessile serrated colon polyps were noted. His 2020 EGD revealed gastritis and he was positive for Barretts.  2023 EGD revealed reflux esophagitis with Barretts esophagus 2023 colonoscopy was normal, no specimens were collected, to be repeate din 5 years

## 2024-07-12 ENCOUNTER — TELEPHONE ENCOUNTER (OUTPATIENT)
Dept: URBAN - METROPOLITAN AREA CLINIC 128 | Facility: CLINIC | Age: 69
End: 2024-07-12

## 2024-07-16 ENCOUNTER — LAB OUTSIDE AN ENCOUNTER (OUTPATIENT)
Dept: URBAN - METROPOLITAN AREA CLINIC 128 | Facility: CLINIC | Age: 69
End: 2024-07-16

## 2024-07-23 ENCOUNTER — TELEPHONE ENCOUNTER (OUTPATIENT)
Dept: URBAN - METROPOLITAN AREA CLINIC 128 | Facility: CLINIC | Age: 69
End: 2024-07-23

## 2024-07-24 ENCOUNTER — CLAIMS CREATED FROM THE CLAIM WINDOW (OUTPATIENT)
Dept: URBAN - METROPOLITAN AREA SURGERY CENTER 31 | Facility: SURGERY CENTER | Age: 69
End: 2024-07-24
Payer: MEDICARE

## 2024-07-24 ENCOUNTER — OFFICE VISIT (OUTPATIENT)
Dept: URBAN - METROPOLITAN AREA SURGERY CENTER 31 | Facility: SURGERY CENTER | Age: 69
End: 2024-07-24

## 2024-07-24 ENCOUNTER — CLAIMS CREATED FROM THE CLAIM WINDOW (OUTPATIENT)
Dept: URBAN - METROPOLITAN AREA CLINIC 4 | Facility: CLINIC | Age: 69
End: 2024-07-24
Payer: MEDICARE

## 2024-07-24 DIAGNOSIS — K22.89 ESOPHAGEAL BLEEDING: ICD-10-CM

## 2024-07-24 DIAGNOSIS — K21.9 GASTRO-ESOPHAGEAL REFLUX DISEASE WITHOUT ESOPHAGITIS: ICD-10-CM

## 2024-07-24 DIAGNOSIS — K31.89 OTHER DISEASES OF STOMACH AND DUODENUM: ICD-10-CM

## 2024-07-24 DIAGNOSIS — K31.89 FOCAL FOVEOLAR HYPERPLASIA: ICD-10-CM

## 2024-07-24 DIAGNOSIS — K22.89 OTHER SPECIFIED DISEASE OF ESOPHAGUS: ICD-10-CM

## 2024-07-24 PROCEDURE — 88312 SPECIAL STAINS GROUP 1: CPT | Performed by: PATHOLOGY

## 2024-07-24 PROCEDURE — 00731 ANES UPR GI NDSC PX NOS: CPT | Performed by: NURSE ANESTHETIST, CERTIFIED REGISTERED

## 2024-07-24 PROCEDURE — 88313 SPECIAL STAINS GROUP 2: CPT | Performed by: PATHOLOGY

## 2024-07-24 PROCEDURE — 88305 TISSUE EXAM BY PATHOLOGIST: CPT | Performed by: PATHOLOGY

## 2024-07-24 RX ORDER — FAMOTIDINE 20 MG/1
1 TABLET AT BEDTIME TABLET, FILM COATED ORAL ONCE A DAY
Qty: 30 TABLET | Refills: 5 | Status: ACTIVE | COMMUNITY
Start: 2024-07-10

## 2024-07-24 RX ORDER — ROSUVASTATIN CALCIUM 5 MG/1
1 TABLET TABLET, COATED ORAL ONCE A DAY
Status: ACTIVE | COMMUNITY

## 2024-07-24 RX ORDER — PANTOPRAZOLE SODIUM 40 MG/1
1 TABLET TABLET, DELAYED RELEASE ORAL ONCE A DAY
Qty: 90 | Refills: 3 | Status: ACTIVE | COMMUNITY

## 2024-07-24 RX ORDER — PANTOPRAZOLE 40 MG/1
TAKE (1) ONE TABLET BY MOUTH ONCE DAILY TABLET, DELAYED RELEASE ORAL
Qty: 90 TABLET | Refills: 1 | Status: ACTIVE | COMMUNITY

## 2024-07-24 RX ORDER — ASPIRIN 81 MG/1
1 TABLET TABLET, COATED ORAL ONCE A DAY
Status: ACTIVE | COMMUNITY

## 2024-08-21 ENCOUNTER — OFFICE VISIT (OUTPATIENT)
Dept: URBAN - METROPOLITAN AREA CLINIC 128 | Facility: CLINIC | Age: 69
End: 2024-08-21
Payer: MEDICARE

## 2024-08-21 VITALS
DIASTOLIC BLOOD PRESSURE: 74 MMHG | WEIGHT: 176 LBS | HEART RATE: 58 BPM | BODY MASS INDEX: 27.62 KG/M2 | TEMPERATURE: 97.9 F | SYSTOLIC BLOOD PRESSURE: 118 MMHG | HEIGHT: 67 IN

## 2024-08-21 DIAGNOSIS — K29.60 OTHER GASTRITIS WITHOUT BLEEDING: ICD-10-CM

## 2024-08-21 DIAGNOSIS — K22.70 BARRETTS ESOPHAGUS: ICD-10-CM

## 2024-08-21 DIAGNOSIS — K59.09 OTHER CONSTIPATION: ICD-10-CM

## 2024-08-21 DIAGNOSIS — K63.5 COLON POLYPS: ICD-10-CM

## 2024-08-21 PROCEDURE — 99214 OFFICE O/P EST MOD 30 MIN: CPT | Performed by: PHYSICIAN ASSISTANT

## 2024-08-21 RX ORDER — FAMOTIDINE 20 MG/1
1 TABLET AT BEDTIME TABLET, FILM COATED ORAL ONCE A DAY
Qty: 30 TABLET | Refills: 5 | OUTPATIENT

## 2024-08-21 RX ORDER — PANTOPRAZOLE SODIUM 40 MG/1
1 TABLET TABLET, DELAYED RELEASE ORAL ONCE A DAY
Qty: 90 | Refills: 3 | OUTPATIENT

## 2024-08-21 RX ORDER — ROSUVASTATIN CALCIUM 5 MG/1
1 TABLET TABLET, COATED ORAL ONCE A DAY
COMMUNITY

## 2024-08-21 RX ORDER — PANTOPRAZOLE SODIUM 40 MG/1
1 TABLET TABLET, DELAYED RELEASE ORAL ONCE A DAY
Qty: 90 | Refills: 3 | COMMUNITY

## 2024-08-21 RX ORDER — FAMOTIDINE 20 MG/1
1 TABLET AT BEDTIME TABLET, FILM COATED ORAL ONCE A DAY
Qty: 30 TABLET | Refills: 5 | COMMUNITY
Start: 2024-07-10

## 2024-08-21 RX ORDER — ASPIRIN 81 MG/1
1 TABLET TABLET, COATED ORAL ONCE A DAY
COMMUNITY

## 2024-08-21 RX ORDER — PANTOPRAZOLE 40 MG/1
TAKE (1) ONE TABLET BY MOUTH ONCE DAILY TABLET, DELAYED RELEASE ORAL
Qty: 90 TABLET | Refills: 1 | COMMUNITY

## 2024-08-21 NOTE — HPI-TODAY'S VISIT:
The patient presents for an evaluation of dysphagia which has improved s/p EGD with dilation in 2024. Previously, he feels like he chokes on his solid food and liquids sometimes. There is no family history of colon polyps or colon cancer. Patient denies change in bowel habits, appetite, and weight. Patient denies rectal bleeding. Patient denies any cardiac, lung, or kidney problems. Prior colonoscopy: 2020, hyperplastic polyp, tubular adenoma, and sessile serrated colon polyps were noted. His 2020 EGD revealed gastritis and he was positive for Barretts.  2023 EGD revealed reflux esophagitis with Barretts esophagus 2023 colonoscopy was normal, no specimens were collected, to be repeated in 5 years  2024 EGD revealed: A) Stomach, Antrum, Biopsy:FOVEOLAR HYPERPLASIA. See Comment.No Evidence of H. Pylori Organisms or Intestinal Metaplasia.Negative for Dysplasia or Malignancy.COMMENT: Foveolar hyperplasia is common in gastric mucosa adjacent to ulcer or as part of chemicalgastropathy (such as NSAID, Alcohol and Bile reflex), and rare in the gastric mucosa overlying a masslesion.(B) Gastroesophageal Junction, Biopsy:SQUAMOCOLUMNAR MUCOSA WITH REFLUX-TYPE CHANGES.No Evidence of Ward's Esophagus or Eosinophilic Esophagitis.Negative for Infectious organisms, Dysplasia or Malignancy.(C) Esophagus, Upper-Third, Biopsy:SQUAMOUS MUCOSA WITHOUT SIGNIFICANT ABNORMALITY; NO COLUMNAR              MUCOSA IDENTIFIED.No Evidence of Infectious Organisms or Eosinophilic Esophagitis.Negative For Dysplasia or Malignancy

## 2024-12-16 ENCOUNTER — APPOINTMENT (OUTPATIENT)
Dept: URBAN - METROPOLITAN AREA CLINIC 159 | Facility: CLINIC | Age: 69
Setting detail: DERMATOLOGY
End: 2024-12-16

## 2024-12-16 DIAGNOSIS — L57.0 ACTINIC KERATOSIS: ICD-10-CM

## 2024-12-16 PROCEDURE — 96567 PDT DSTR PRMLG LES SKN: CPT

## 2024-12-16 PROCEDURE — ? PDT: BLUE

## 2024-12-16 ASSESSMENT — LOCATION DETAILED DESCRIPTION DERM: LOCATION DETAILED: MEDIAL FRONTAL SCALP

## 2024-12-16 ASSESSMENT — LOCATION SIMPLE DESCRIPTION DERM: LOCATION SIMPLE: FRONTAL SCALP

## 2024-12-16 ASSESSMENT — LOCATION ZONE DERM: LOCATION ZONE: SCALP

## 2024-12-16 NOTE — PROCEDURE: PDT: BLUE
Occlusion: No
Comments: [address]
Light Source: Huber-U
Total Number Of Aks Treated (Optional To Report): 0
Show Medical Necessity In Plan?: Yes
Who Performed The Pdt?: Performed by Nurse, MA or Aesthetician ([id number])
Debridement Text (Will Only Render In Visit Note If You Select Debridement Option Under Who Performed The Pdt Field): Prior to application of the photodynamic medication the hyperkeratotic lesions were curetted to make them more amenable to therapy.
Incubation Time: 02:00:00
Number Of Kerasticks/Tubes Billed For: 1
Detail Level: Zone
Pre-Procedure Text: The treatment areas were cleaned and prepped in the usual fashion.
Consent: Written consent obtained.  The risks were reviewed with the patient including but not limited to: pigmentary changes, pain, blistering, scabbing, redness, and the remote possibility of scarring.
Incubation Start Time: 1000am
Incubation End Time: 1200 pm
Anesthesia Type: 1% lidocaine with epinephrine
Which Photosensitizer Was Used: Levulan
Post-Care Instructions: I reviewed with the patient in detail post-care instructions. Patient is to avoid sunlight for the next 2 days, and wear sun protection. Patients may expect sunburn like redness, discomfort and scabbing.
Who Performed The Pdt (Staff): kami
Show Inventory Tab: Show
Medical Necessity: Precancerous Lesions
Illumination Time: 00:16:40
Dressing (No Sutures): dry sterile dressing

## 2025-02-03 ENCOUNTER — OFFICE VISIT (OUTPATIENT)
Dept: URBAN - METROPOLITAN AREA CLINIC 128 | Facility: CLINIC | Age: 70
End: 2025-02-03
Payer: COMMERCIAL

## 2025-02-03 ENCOUNTER — LAB OUTSIDE AN ENCOUNTER (OUTPATIENT)
Dept: URBAN - METROPOLITAN AREA CLINIC 128 | Facility: CLINIC | Age: 70
End: 2025-02-03

## 2025-02-03 VITALS
HEIGHT: 67 IN | TEMPERATURE: 98.1 F | DIASTOLIC BLOOD PRESSURE: 80 MMHG | WEIGHT: 179.4 LBS | SYSTOLIC BLOOD PRESSURE: 132 MMHG | BODY MASS INDEX: 28.16 KG/M2 | HEART RATE: 70 BPM

## 2025-02-03 DIAGNOSIS — K63.5 COLON POLYPS: ICD-10-CM

## 2025-02-03 DIAGNOSIS — R10.31 RIGHT LOWER QUADRANT ABDOMINAL PAIN: ICD-10-CM

## 2025-02-03 DIAGNOSIS — K22.70 BARRETTS ESOPHAGUS: ICD-10-CM

## 2025-02-03 DIAGNOSIS — R13.10 DYSPHAGIA, UNSPECIFIED TYPE: ICD-10-CM

## 2025-02-03 DIAGNOSIS — M79.3 PANNICULITIS: ICD-10-CM

## 2025-02-03 DIAGNOSIS — K29.70 GASTRITIS: ICD-10-CM

## 2025-02-03 DIAGNOSIS — R09.89 CHOKING SENSATION: ICD-10-CM

## 2025-02-03 DIAGNOSIS — K59.01 CONSTIPATION: ICD-10-CM

## 2025-02-03 PROCEDURE — 99214 OFFICE O/P EST MOD 30 MIN: CPT | Performed by: PHYSICIAN ASSISTANT

## 2025-02-03 RX ORDER — FAMOTIDINE 20 MG/1
1 TABLET AT BEDTIME TABLET, FILM COATED ORAL ONCE A DAY
Qty: 30 TABLET | Refills: 5 | OUTPATIENT

## 2025-02-03 RX ORDER — HYOSCYAMINE SULFATE 0.12 MG/1
1 TABLET UNDER THE TONGUE AND ALLOW TO DISSOLVE  AS NEEDED TABLET, ORALLY DISINTEGRATING ORAL THREE TIMES A DAY
Qty: 30 | Refills: 0 | OUTPATIENT
Start: 2025-02-03 | End: 2025-03-05

## 2025-02-03 RX ORDER — PANTOPRAZOLE SODIUM 40 MG/1
1 TABLET TABLET, DELAYED RELEASE ORAL ONCE A DAY
Qty: 90 | Refills: 3 | OUTPATIENT

## 2025-02-03 RX ORDER — PANTOPRAZOLE SODIUM 40 MG/1
1 TABLET TABLET, DELAYED RELEASE ORAL ONCE A DAY
Qty: 90 | Refills: 3 | Status: ACTIVE | COMMUNITY

## 2025-02-03 RX ORDER — ASPIRIN 81 MG/1
1 TABLET TABLET, COATED ORAL ONCE A DAY
Status: ACTIVE | COMMUNITY

## 2025-02-03 RX ORDER — LINACLOTIDE 72 UG/1
1 CAPSULE AT LEAST 30 MINUTES BEFORE THE FIRST MEAL OF THE DAY ON AN EMPTY STOMACH CAPSULE, GELATIN COATED ORAL ONCE A DAY
Qty: 90 | Refills: 3 | OUTPATIENT
Start: 2025-02-03 | End: 2026-01-29

## 2025-02-03 RX ORDER — ROSUVASTATIN CALCIUM 5 MG/1
1 TABLET TABLET, COATED ORAL ONCE A DAY
Status: ACTIVE | COMMUNITY

## 2025-02-03 RX ORDER — FAMOTIDINE 20 MG/1
1 TABLET AT BEDTIME TABLET, FILM COATED ORAL ONCE A DAY
Qty: 30 TABLET | Refills: 5 | Status: ON HOLD | COMMUNITY

## 2025-02-03 NOTE — HPI-TODAY'S VISIT:
The patient presents for an evaluation of dysphagia which has improved s/p EGD with dilation in 2024. Previously, he feels like he chokes on his solid food and liquids sometimes. There is no family history of colon polyps or colon cancer. Patient denies change in bowel habits, appetite, and weight. Patient denies rectal bleeding. Patient denies any cardiac, lung, or kidney problems. Prior colonoscopy: 2020, hyperplastic polyp, tubular adenoma, and sessile serrated colon polyps were noted. His 2020 EGD revealed gastritis and he was positive for Barretts.  2023 EGD revealed reflux esophagitis with Barretts esophagus 2023 colonoscopy was normal, no specimens were collected, to be repeated in 5 years  2024 EGD revealed: A) Stomach, Antrum, Biopsy:FOVEOLAR HYPERPLASIA. See Comment.No Evidence of H. Pylori Organisms or Intestinal Metaplasia.Negative for Dysplasia or Malignancy.COMMENT: Foveolar hyperplasia is common in gastric mucosa adjacent to ulcer or as part of chemicalgastropathy (such as NSAID, Alcohol and Bile reflex), and rare in the gastric mucosa overlying a masslesion.(B) Gastroesophageal Junction, Biopsy:SQUAMOCOLUMNAR MUCOSA WITH REFLUX-TYPE CHANGES.No Evidence of Ward's Esophagus or Eosinophilic Esophagitis.Negative for Infectious organisms, Dysplasia or Malignancy.(C) Esophagus, Upper-Third, Biopsy:SQUAMOUS MUCOSA WITHOUT SIGNIFICANT ABNORMALITY; NO COLUMNAR              MUCOSA IDENTIFIED.No Evidence of Infectious Organisms or Eosinophilic Esophagitis.Negative For Dysplasia or Malignancy 2024 barium swallow and chest xray were negative. 01/2025  Abdominal and pelvic CT scan revealed Faint mesenteric stranding within the inferior mesenteric artery distribution, this could represent paniculitis or minimal inflammation from surrounding bowel loops, would recommend follow up CT pelvis without contrast in three months to ensure stability or resolution. Appendix is not seen however no mike- stranding. No bowel obstruction noted. Nodules measuring less than six millimeter was noted and a repeat chest ct and 12 months was advised and iwll be ordered by PCP. He is having RLQ abdominal pain still that is a 3/10. he is having constipation.

## 2025-02-03 NOTE — PHYSICAL EXAM GASTROINTESTINAL
Abdomen , soft, non-tender, nondistended , no guarding or rigidity , no masses palpable , normal bowel sounds, negative Corona's sign, negative Rovsing's sign, negative psoas and obturators signs, negative CVA tenderness bilaterally Liver and Spleen , no hepatosplenomegaly Rectal deferred

## 2025-05-12 ENCOUNTER — OFFICE VISIT (OUTPATIENT)
Dept: URBAN - METROPOLITAN AREA CLINIC 128 | Facility: CLINIC | Age: 70
End: 2025-05-12

## 2025-08-22 ENCOUNTER — ERX REFILL RESPONSE (OUTPATIENT)
Dept: URBAN - METROPOLITAN AREA CLINIC 128 | Facility: CLINIC | Age: 70
End: 2025-08-22

## 2025-08-22 RX ORDER — PANTOPRAZOLE 40 MG/1
TAKE ONE TABLET BY MOUTH EVERY DAY TABLET, DELAYED RELEASE ORAL
Qty: 90 TABLET | Refills: 3 | OUTPATIENT

## 2025-08-22 RX ORDER — PANTOPRAZOLE SODIUM 40 MG/1
1 TABLET TABLET, DELAYED RELEASE ORAL ONCE A DAY
Qty: 90 | Refills: 3 | OUTPATIENT